# Patient Record
Sex: MALE | Race: WHITE | NOT HISPANIC OR LATINO | Employment: OTHER
[De-identification: names, ages, dates, MRNs, and addresses within clinical notes are randomized per-mention and may not be internally consistent; named-entity substitution may affect disease eponyms.]

---

## 2018-04-01 ENCOUNTER — HEALTH MAINTENANCE LETTER (OUTPATIENT)
Age: 68
End: 2018-04-01

## 2023-07-20 ENCOUNTER — TRANSFERRED RECORDS (OUTPATIENT)
Dept: MULTI SPECIALTY CLINIC | Facility: CLINIC | Age: 73
End: 2023-07-20

## 2023-07-20 LAB — RETINOPATHY: NORMAL

## 2024-05-06 ENCOUNTER — OFFICE VISIT (OUTPATIENT)
Dept: FAMILY MEDICINE | Facility: CLINIC | Age: 74
End: 2024-05-06
Payer: COMMERCIAL

## 2024-05-06 VITALS
OXYGEN SATURATION: 98 % | DIASTOLIC BLOOD PRESSURE: 80 MMHG | WEIGHT: 203.4 LBS | HEIGHT: 67 IN | TEMPERATURE: 97.9 F | BODY MASS INDEX: 31.92 KG/M2 | RESPIRATION RATE: 16 BRPM | HEART RATE: 61 BPM | SYSTOLIC BLOOD PRESSURE: 131 MMHG

## 2024-05-06 DIAGNOSIS — I10 HYPERTENSION GOAL BP (BLOOD PRESSURE) < 140/90: Primary | ICD-10-CM

## 2024-05-06 DIAGNOSIS — N18.31 TYPE 2 DIABETES MELLITUS WITH STAGE 3A CHRONIC KIDNEY DISEASE, WITHOUT LONG-TERM CURRENT USE OF INSULIN (H): ICD-10-CM

## 2024-05-06 DIAGNOSIS — R97.20 ELEVATED PROSTATE SPECIFIC ANTIGEN (PSA): ICD-10-CM

## 2024-05-06 DIAGNOSIS — Z12.5 SCREENING FOR PROSTATE CANCER: ICD-10-CM

## 2024-05-06 DIAGNOSIS — E11.22 TYPE 2 DIABETES MELLITUS WITH STAGE 3A CHRONIC KIDNEY DISEASE, WITHOUT LONG-TERM CURRENT USE OF INSULIN (H): ICD-10-CM

## 2024-05-06 DIAGNOSIS — E11.8 TYPE II DIABETES MELLITUS WITH COMPLICATION (H): ICD-10-CM

## 2024-05-06 LAB
ALBUMIN SERPL BCG-MCNC: 4.4 G/DL (ref 3.5–5.2)
ANION GAP SERPL CALCULATED.3IONS-SCNC: 11 MMOL/L (ref 7–15)
BUN SERPL-MCNC: 30.9 MG/DL (ref 8–23)
CALCIUM SERPL-MCNC: 9.7 MG/DL (ref 8.8–10.2)
CHLORIDE SERPL-SCNC: 104 MMOL/L (ref 98–107)
CREAT SERPL-MCNC: 1.33 MG/DL (ref 0.67–1.17)
DEPRECATED HCO3 PLAS-SCNC: 24 MMOL/L (ref 22–29)
EGFRCR SERPLBLD CKD-EPI 2021: 55 ML/MIN/1.73M2
GLUCOSE SERPL-MCNC: 131 MG/DL (ref 70–99)
HBA1C MFR BLD: 6.7 % (ref 0–5.6)
PHOSPHATE SERPL-MCNC: 3.4 MG/DL (ref 2.5–4.5)
POTASSIUM SERPL-SCNC: 5.3 MMOL/L (ref 3.4–5.3)
PSA SERPL DL<=0.01 NG/ML-MCNC: 4 NG/ML (ref 0–6.5)
SODIUM SERPL-SCNC: 139 MMOL/L (ref 135–145)

## 2024-05-06 PROCEDURE — G0103 PSA SCREENING: HCPCS | Performed by: FAMILY MEDICINE

## 2024-05-06 PROCEDURE — 83036 HEMOGLOBIN GLYCOSYLATED A1C: CPT | Performed by: FAMILY MEDICINE

## 2024-05-06 PROCEDURE — 99204 OFFICE O/P NEW MOD 45 MIN: CPT | Performed by: FAMILY MEDICINE

## 2024-05-06 PROCEDURE — 80069 RENAL FUNCTION PANEL: CPT | Performed by: FAMILY MEDICINE

## 2024-05-06 PROCEDURE — 36415 COLL VENOUS BLD VENIPUNCTURE: CPT | Performed by: FAMILY MEDICINE

## 2024-05-06 PROCEDURE — G2211 COMPLEX E/M VISIT ADD ON: HCPCS | Performed by: FAMILY MEDICINE

## 2024-05-06 RX ORDER — LISINOPRIL 10 MG/1
1 TABLET ORAL DAILY
COMMUNITY
Start: 2024-03-16 | End: 2024-06-18

## 2024-05-06 RX ORDER — METFORMIN HYDROCHLORIDE 750 MG/1
TABLET, EXTENDED RELEASE ORAL
COMMUNITY
Start: 2023-11-22 | End: 2024-06-18

## 2024-05-06 SDOH — HEALTH STABILITY: PHYSICAL HEALTH: ON AVERAGE, HOW MANY DAYS PER WEEK DO YOU ENGAGE IN MODERATE TO STRENUOUS EXERCISE (LIKE A BRISK WALK)?: 4 DAYS

## 2024-05-06 SDOH — HEALTH STABILITY: PHYSICAL HEALTH: ON AVERAGE, HOW MANY MINUTES DO YOU ENGAGE IN EXERCISE AT THIS LEVEL?: 40 MIN

## 2024-05-06 ASSESSMENT — SOCIAL DETERMINANTS OF HEALTH (SDOH): HOW OFTEN DO YOU GET TOGETHER WITH FRIENDS OR RELATIVES?: THREE TIMES A WEEK

## 2024-05-06 NOTE — PROGRESS NOTES
"  Assessment & Plan       ICD-10-CM    1. Hypertension goal BP (blood pressure) < 140/90  I10 Hemoglobin A1c     Renal panel (Alb, BUN, Ca, Cl, CO2, Creat, Gluc, Phos, K, Na)     Hemoglobin A1c     Renal panel (Alb, BUN, Ca, Cl, CO2, Creat, Gluc, Phos, K, Na)      2. Elevated prostate specific antigen (PSA)  R97.20 Prostate Specific Antigen Screen     Prostate Specific Antigen Screen      3. Screening for prostate cancer  Z12.5 Prostate Specific Antigen Screen     Prostate Specific Antigen Screen      4. Type 2 diabetes mellitus with stage 3a chronic kidney disease, without long-term current use of insulin (H)  E11.22 Hemoglobin A1c    N18.31 Renal panel (Alb, BUN, Ca, Cl, CO2, Creat, Gluc, Phos, K, Na)     Hemoglobin A1c     Renal panel (Alb, BUN, Ca, Cl, CO2, Creat, Gluc, Phos, K, Na)      5. Type II diabetes mellitus with complication (H)  E11.8         The longitudinal plan of care for the diagnosis(es)/condition(s) as documented were addressed during this visit. Due to the added complexity in care, I will continue to support Timothy in the subsequent management and with ongoing continuity of care.     Patient has been advised of split billing requirements and indicates understanding: Yes  Review of external notes as documented elsewhere in note        BMI  Estimated body mass index is 31.85 kg/m  as calculated from the following:    Height as of this encounter: 1.702 m (5' 7.01\").    Weight as of this encounter: 92.3 kg (203 lb 6.4 oz).   Weight management plan: Discussed healthy diet and exercise guidelines    Counseling  Appropriate preventive services were discussed with this patient, including applicable screening as appropriate for fall prevention, nutrition, physical activity, Tobacco-use cessation, weight loss and cognition.  Checklist reviewing preventive services available has been given to the patient.  Reviewed patient's diet, addressing concerns and/or questions.   He is at risk for psychosocial " distress and has been provided with information to reduce risk.   The patient was provided with written information regarding signs of hearing loss.       There are no Patient Instructions on file for this visit.    Brynn Aguirre is a 74 year old, presenting for the following health issues:  Diabetes, Blood Draw (PSA and kidney), and Establish Care      5/6/2024     8:34 AM   Additional Questions   Roomed by Ashley   Accompanied by lawanda         5/6/2024     8:34 AM   Patient Reported Additional Medications   Patient reports taking the following new medications metformin  mg tablet,extended release 24 hr, lisinopril 10 mg tablet     History of Present Illness       Reason for visit:  Looking for new health care provider and clinicHe exercises with enough effort to increase his heart rate 30 to 60 minutes per day.  He exercises with enough effort to increase his heart rate 4 days per week.   He is taking medications regularly.         Diabetes Follow-up    How often are you checking your blood sugar? A few times a month  What time of day are you checking your blood sugars (select all that apply)?  Before meals  Have you had any blood sugars above 200?  No  Have you had any blood sugars below 70?  No  What symptoms do you notice when your blood sugar is low?  None  What concerns do you have today about your diabetes? None   Do you have any of these symptoms? (Select all that apply)  No numbness or tingling in feet.  No redness, sores or blisters on feet.  No complaints of excessive thirst.  No reports of blurry vision.  No significant changes to weight.      Wt Readings from Last 4 Encounters:   05/06/24 92.3 kg (203 lb 6.4 oz)   03/30/13 102.9 kg (226 lb 12.8 oz)   10/12/12 99.3 kg (219 lb)   10/18/11 103.2 kg (227 lb 9.6 oz)       BP Readings from Last 2 Encounters:   05/06/24 131/80   03/30/13 149/87     LDL Cholesterol Calculated (mg/dL)   Date Value   10/05/2012 135 (H)   06/13/2011 139 (H)  "              Review of Systems  Constitutional, HEENT, cardiovascular, pulmonary, gi and gu systems are negative, except as otherwise noted.      Objective    /80   Pulse 61   Temp 97.9  F (36.6  C) (Temporal)   Resp 16   Ht 1.702 m (5' 7.01\")   Wt 92.3 kg (203 lb 6.4 oz)   SpO2 98%   BMI 31.85 kg/m    Body mass index is 31.85 kg/m .  Physical Exam  Constitutional:       General: He is not in acute distress.     Appearance: Normal appearance. He is well-developed. He is not ill-appearing.   HENT:      Head: Normocephalic and atraumatic.      Right Ear: External ear normal.      Left Ear: External ear normal.      Nose: Nose normal.   Eyes:      General: No scleral icterus.     Extraocular Movements: Extraocular movements intact.      Conjunctiva/sclera: Conjunctivae normal.   Cardiovascular:      Rate and Rhythm: Normal rate.   Pulmonary:      Effort: Pulmonary effort is normal.   Musculoskeletal:      Cervical back: Normal range of motion and neck supple.   Skin:     General: Skin is warm and dry.   Neurological:      Mental Status: He is alert and oriented to person, place, and time.   Psychiatric:         Behavior: Behavior normal.         Thought Content: Thought content normal.         Judgment: Judgment normal.                    Signed Electronically by: Royce Nicole MD    "

## 2024-05-07 ENCOUNTER — TRANSFERRED RECORDS (OUTPATIENT)
Dept: HEALTH INFORMATION MANAGEMENT | Facility: CLINIC | Age: 74
End: 2024-05-07
Payer: COMMERCIAL

## 2024-05-08 ENCOUNTER — TRANSFERRED RECORDS (OUTPATIENT)
Dept: HEALTH INFORMATION MANAGEMENT | Facility: CLINIC | Age: 74
End: 2024-05-08

## 2024-05-13 DIAGNOSIS — R79.89 ELEVATED SERUM CREATININE: Primary | ICD-10-CM

## 2024-05-21 ENCOUNTER — TRANSFERRED RECORDS (OUTPATIENT)
Dept: HEALTH INFORMATION MANAGEMENT | Facility: CLINIC | Age: 74
End: 2024-05-21
Payer: COMMERCIAL

## 2024-05-30 ENCOUNTER — TRANSFERRED RECORDS (OUTPATIENT)
Dept: HEALTH INFORMATION MANAGEMENT | Facility: CLINIC | Age: 74
End: 2024-05-30

## 2024-06-05 ENCOUNTER — TRANSFERRED RECORDS (OUTPATIENT)
Dept: HEALTH INFORMATION MANAGEMENT | Facility: CLINIC | Age: 74
End: 2024-06-05

## 2024-06-12 ENCOUNTER — TRANSFERRED RECORDS (OUTPATIENT)
Dept: HEALTH INFORMATION MANAGEMENT | Facility: CLINIC | Age: 74
End: 2024-06-12

## 2024-06-18 DIAGNOSIS — E11.22 TYPE 2 DIABETES MELLITUS WITH STAGE 3A CHRONIC KIDNEY DISEASE, WITHOUT LONG-TERM CURRENT USE OF INSULIN (H): ICD-10-CM

## 2024-06-18 DIAGNOSIS — N18.31 TYPE 2 DIABETES MELLITUS WITH STAGE 3A CHRONIC KIDNEY DISEASE, WITHOUT LONG-TERM CURRENT USE OF INSULIN (H): ICD-10-CM

## 2024-06-18 DIAGNOSIS — I10 HYPERTENSION GOAL BP (BLOOD PRESSURE) < 140/90: Primary | ICD-10-CM

## 2024-06-18 RX ORDER — METFORMIN HYDROCHLORIDE 750 MG/1
750 TABLET, EXTENDED RELEASE ORAL 2 TIMES DAILY WITH MEALS
Qty: 180 TABLET | Refills: 1 | Status: SHIPPED | OUTPATIENT
Start: 2024-06-18 | End: 2024-09-16

## 2024-06-18 RX ORDER — LISINOPRIL 10 MG/1
10 TABLET ORAL DAILY
Qty: 90 TABLET | Refills: 1 | Status: SHIPPED | OUTPATIENT
Start: 2024-06-18

## 2024-06-20 ENCOUNTER — TRANSFERRED RECORDS (OUTPATIENT)
Dept: HEALTH INFORMATION MANAGEMENT | Facility: CLINIC | Age: 74
End: 2024-06-20
Payer: COMMERCIAL

## 2024-07-15 ENCOUNTER — OFFICE VISIT (OUTPATIENT)
Dept: FAMILY MEDICINE | Facility: CLINIC | Age: 74
End: 2024-07-15
Payer: COMMERCIAL

## 2024-07-15 VITALS
SYSTOLIC BLOOD PRESSURE: 133 MMHG | HEART RATE: 68 BPM | WEIGHT: 201.4 LBS | RESPIRATION RATE: 16 BRPM | BODY MASS INDEX: 31.61 KG/M2 | OXYGEN SATURATION: 98 % | DIASTOLIC BLOOD PRESSURE: 82 MMHG | TEMPERATURE: 97.8 F | HEIGHT: 67 IN

## 2024-07-15 DIAGNOSIS — C61 PRIMARY MALIGNANT NEOPLASM OF PROSTATE (H): ICD-10-CM

## 2024-07-15 DIAGNOSIS — R97.20 ELEVATED PROSTATE SPECIFIC ANTIGEN (PSA): ICD-10-CM

## 2024-07-15 DIAGNOSIS — Z12.11 SCREEN FOR COLON CANCER: ICD-10-CM

## 2024-07-15 DIAGNOSIS — E11.8 TYPE II DIABETES MELLITUS WITH COMPLICATION (H): Primary | ICD-10-CM

## 2024-07-15 DIAGNOSIS — R79.89 ELEVATED SERUM CREATININE: ICD-10-CM

## 2024-07-15 DIAGNOSIS — Z12.5 SCREENING FOR PROSTATE CANCER: ICD-10-CM

## 2024-07-15 PROBLEM — N40.1 LOWER URINARY TRACT SYMPTOMS DUE TO BENIGN PROSTATIC HYPERPLASIA: Status: ACTIVE | Noted: 2023-11-29

## 2024-07-15 PROBLEM — K76.0 FATTY LIVER: Status: ACTIVE | Noted: 2022-07-30

## 2024-07-15 PROBLEM — R94.120 ABNORMAL AUDITORY FUNCTION STUDY: Status: ACTIVE | Noted: 2018-01-03

## 2024-07-15 PROBLEM — N18.30 CKD (CHRONIC KIDNEY DISEASE) STAGE 3, GFR 30-59 ML/MIN (H): Status: ACTIVE | Noted: 2019-10-10

## 2024-07-15 PROBLEM — R06.83 SNORING: Status: ACTIVE | Noted: 2018-12-17

## 2024-07-15 PROBLEM — E55.9 VITAMIN D DEFICIENCY: Status: ACTIVE | Noted: 2018-12-17

## 2024-07-15 PROBLEM — M16.12 PRIMARY OSTEOARTHRITIS OF LEFT HIP: Status: ACTIVE | Noted: 2019-10-31

## 2024-07-15 LAB
ALBUMIN SERPL BCG-MCNC: 4.4 G/DL (ref 3.5–5.2)
ANION GAP SERPL CALCULATED.3IONS-SCNC: 12 MMOL/L (ref 7–15)
BUN SERPL-MCNC: 25.5 MG/DL (ref 8–23)
CALCIUM SERPL-MCNC: 9.8 MG/DL (ref 8.8–10.2)
CHLORIDE SERPL-SCNC: 102 MMOL/L (ref 98–107)
CHOLEST SERPL-MCNC: 185 MG/DL
CREAT SERPL-MCNC: 1.32 MG/DL (ref 0.67–1.17)
CREAT UR-MCNC: 167 MG/DL
EGFRCR SERPLBLD CKD-EPI 2021: 56 ML/MIN/1.73M2
FASTING STATUS PATIENT QL REPORTED: YES
GLUCOSE SERPL-MCNC: 145 MG/DL (ref 70–99)
HBA1C MFR BLD: 6.8 % (ref 0–5.6)
HCO3 SERPL-SCNC: 25 MMOL/L (ref 22–29)
HDLC SERPL-MCNC: 35 MG/DL
LDLC SERPL CALC-MCNC: 119 MG/DL
MICROALBUMIN UR-MCNC: 27.6 MG/L
MICROALBUMIN/CREAT UR: 16.53 MG/G CR (ref 0–17)
NONHDLC SERPL-MCNC: 150 MG/DL
PHOSPHATE SERPL-MCNC: 3.8 MG/DL (ref 2.5–4.5)
POTASSIUM SERPL-SCNC: 5.2 MMOL/L (ref 3.4–5.3)
PSA SERPL DL<=0.01 NG/ML-MCNC: 4.75 NG/ML (ref 0–6.5)
SODIUM SERPL-SCNC: 139 MMOL/L (ref 135–145)
TRIGL SERPL-MCNC: 153 MG/DL

## 2024-07-15 PROCEDURE — 82043 UR ALBUMIN QUANTITATIVE: CPT | Performed by: FAMILY MEDICINE

## 2024-07-15 PROCEDURE — 80061 LIPID PANEL: CPT | Performed by: FAMILY MEDICINE

## 2024-07-15 PROCEDURE — 83036 HEMOGLOBIN GLYCOSYLATED A1C: CPT | Performed by: FAMILY MEDICINE

## 2024-07-15 PROCEDURE — 82570 ASSAY OF URINE CREATININE: CPT | Performed by: FAMILY MEDICINE

## 2024-07-15 PROCEDURE — G2211 COMPLEX E/M VISIT ADD ON: HCPCS | Performed by: FAMILY MEDICINE

## 2024-07-15 PROCEDURE — 80069 RENAL FUNCTION PANEL: CPT | Performed by: FAMILY MEDICINE

## 2024-07-15 PROCEDURE — 36415 COLL VENOUS BLD VENIPUNCTURE: CPT | Performed by: FAMILY MEDICINE

## 2024-07-15 PROCEDURE — 84153 ASSAY OF PSA TOTAL: CPT | Performed by: FAMILY MEDICINE

## 2024-07-15 PROCEDURE — 99214 OFFICE O/P EST MOD 30 MIN: CPT | Performed by: FAMILY MEDICINE

## 2024-07-15 NOTE — PROGRESS NOTES
Assessment & Plan       ICD-10-CM    1. Type II diabetes mellitus with complication (H)  E11.8 Albumin Random Urine Quantitative with Creat Ratio     Lipid panel reflex to direct LDL Fasting     Hemoglobin A1c     Albumin Random Urine Quantitative with Creat Ratio     Lipid panel reflex to direct LDL Fasting     Hemoglobin A1c      2. Screen for colon cancer  Z12.11       3. Elevated prostate specific antigen (PSA)  R97.20 Prostate Specific Antigen Screen     Prostate Specific Antigen Screen      4. Screening for prostate cancer  Z12.5 Prostate Specific Antigen Screen     Prostate Specific Antigen Screen      5. Elevated serum creatinine  R79.89 Renal panel (Alb, BUN, Ca, Cl, CO2, Creat, Gluc, Phos, K, Na)      6. Primary malignant neoplasm of prostate (H)  C61                   The longitudinal plan of care for the diagnosis(es)/condition(s) as documented were addressed during this visit. Due to the added complexity in care, I will continue to support Timothy in the subsequent management and with ongoing continuity of care.   There are no Patient Instructions on file for this visit.    Brynn Aguirre is a 74 year old, presenting for the following health issues:  Patient Request (Prostate dianosis) and Diabetes        7/15/2024     7:04 AM   Additional Questions   Roomed by Ashley   Accompanied by none         7/15/2024     7:04 AM   Patient Reported Additional Medications   Patient reports taking the following new medications none     Via the Health Maintenance questionnaire, the patient has reported the following services have been completed -Eye Exam: [facility] 2023-07-20, this information has been sent to the abstraction team.  History of Present Illness       CKD: He uses over the counter pain medication, including Tylenol, a few times a week.    Diabetes:   He presents for follow up of diabetes.  He is checking home blood glucose a few times a month.   He checks blood glucose before meals.   "Blood glucose is never over 200 and never under 70.  When his blood glucose is low, the patient is asymptomatic for confusion, blurred vision, lethargy and reports not feeling dizzy, shaky, or weak.   He has no concerns regarding his diabetes at this time.   He is not experiencing numbness or burning in feet, excessive thirst, blurry vision, weight changes or redness, sores or blisters on feet. The patient has had a diabetic eye exam in the last 12 months. Eye exam performed on 9/2023. Location of last eye exam [facility].        Vascular Disease:  He presents for follow up of vascular disease.     He never takes nitroglycerin. He takes daily aspirin.    Reason for visit:  A1C, PSA, Refer cardiology, inform about prostate cancer    He eats 0-1 servings of fruits and vegetables daily.He consumes 0 sweetened beverage(s) daily.He exercises with enough effort to increase his heart rate 30 to 60 minutes per day.  He exercises with enough effort to increase his heart rate 4 days per week.   He is taking medications regularly.     BP Readings from Last 6 Encounters:   07/15/24 133/82   05/06/24 131/80   03/30/13 149/87   10/12/12 124/68   10/18/11 134/78   06/13/11 136/82                 Review of Systems  Constitutional, HEENT, cardiovascular, pulmonary, gi and gu systems are negative, except as otherwise noted.      Objective    /82   Pulse 68   Temp 97.8  F (36.6  C) (Temporal)   Resp 16   Ht 1.702 m (5' 7.01\")   Wt 91.4 kg (201 lb 6.4 oz)   SpO2 98%   BMI 31.53 kg/m    Body mass index is 31.53 kg/m .  Physical Exam  Constitutional:       General: He is not in acute distress.     Appearance: Normal appearance. He is well-developed. He is not ill-appearing.   HENT:      Head: Normocephalic and atraumatic.      Right Ear: External ear normal.      Left Ear: External ear normal.      Nose: Nose normal.   Eyes:      General: No scleral icterus.     Extraocular Movements: Extraocular movements " intact.      Conjunctiva/sclera: Conjunctivae normal.   Cardiovascular:      Rate and Rhythm: Normal rate.   Pulmonary:      Effort: Pulmonary effort is normal.   Musculoskeletal:      Cervical back: Normal range of motion and neck supple.   Skin:     General: Skin is warm and dry.   Neurological:      Mental Status: He is alert and oriented to person, place, and time.   Psychiatric:         Behavior: Behavior normal.         Thought Content: Thought content normal.         Judgment: Judgment normal.                    Signed Electronically by: Royce Nicole MD

## 2024-08-28 ENCOUNTER — PATIENT OUTREACH (OUTPATIENT)
Dept: CARE COORDINATION | Facility: CLINIC | Age: 74
End: 2024-08-28
Payer: COMMERCIAL

## 2024-09-04 ENCOUNTER — TRANSFERRED RECORDS (OUTPATIENT)
Dept: HEALTH INFORMATION MANAGEMENT | Facility: CLINIC | Age: 74
End: 2024-09-04
Payer: COMMERCIAL

## 2024-09-11 ENCOUNTER — PATIENT OUTREACH (OUTPATIENT)
Dept: CARE COORDINATION | Facility: CLINIC | Age: 74
End: 2024-09-11
Payer: COMMERCIAL

## 2024-10-14 ENCOUNTER — TRANSFERRED RECORDS (OUTPATIENT)
Dept: HEALTH INFORMATION MANAGEMENT | Facility: CLINIC | Age: 74
End: 2024-10-14
Payer: COMMERCIAL

## 2024-11-17 ENCOUNTER — HEALTH MAINTENANCE LETTER (OUTPATIENT)
Age: 74
End: 2024-11-17

## 2024-12-04 DIAGNOSIS — I10 HYPERTENSION GOAL BP (BLOOD PRESSURE) < 140/90: ICD-10-CM

## 2024-12-04 RX ORDER — LISINOPRIL 10 MG/1
10 TABLET ORAL DAILY
Qty: 90 TABLET | Refills: 1 | Status: SHIPPED | OUTPATIENT
Start: 2024-12-04

## 2024-12-10 ENCOUNTER — TRANSFERRED RECORDS (OUTPATIENT)
Dept: HEALTH INFORMATION MANAGEMENT | Facility: CLINIC | Age: 74
End: 2024-12-10
Payer: COMMERCIAL

## 2024-12-13 SDOH — HEALTH STABILITY: PHYSICAL HEALTH: ON AVERAGE, HOW MANY MINUTES DO YOU ENGAGE IN EXERCISE AT THIS LEVEL?: 30 MIN

## 2024-12-13 SDOH — HEALTH STABILITY: PHYSICAL HEALTH: ON AVERAGE, HOW MANY DAYS PER WEEK DO YOU ENGAGE IN MODERATE TO STRENUOUS EXERCISE (LIKE A BRISK WALK)?: 3 DAYS

## 2024-12-13 ASSESSMENT — SOCIAL DETERMINANTS OF HEALTH (SDOH): HOW OFTEN DO YOU GET TOGETHER WITH FRIENDS OR RELATIVES?: TWICE A WEEK

## 2024-12-17 ENCOUNTER — OFFICE VISIT (OUTPATIENT)
Dept: FAMILY MEDICINE | Facility: CLINIC | Age: 74
End: 2024-12-17
Payer: COMMERCIAL

## 2024-12-17 VITALS
HEIGHT: 67 IN | RESPIRATION RATE: 20 BRPM | OXYGEN SATURATION: 97 % | SYSTOLIC BLOOD PRESSURE: 126 MMHG | DIASTOLIC BLOOD PRESSURE: 78 MMHG | BODY MASS INDEX: 32.87 KG/M2 | HEART RATE: 68 BPM | TEMPERATURE: 97.6 F | WEIGHT: 209.4 LBS

## 2024-12-17 DIAGNOSIS — Z00.00 WELL ADULT EXAM: Primary | ICD-10-CM

## 2024-12-17 DIAGNOSIS — N18.31 TYPE 2 DIABETES MELLITUS WITH STAGE 3A CHRONIC KIDNEY DISEASE, WITHOUT LONG-TERM CURRENT USE OF INSULIN (H): ICD-10-CM

## 2024-12-17 DIAGNOSIS — E11.22 TYPE 2 DIABETES MELLITUS WITH STAGE 3A CHRONIC KIDNEY DISEASE, WITHOUT LONG-TERM CURRENT USE OF INSULIN (H): ICD-10-CM

## 2024-12-17 DIAGNOSIS — N18.31 STAGE 3A CHRONIC KIDNEY DISEASE (H): ICD-10-CM

## 2024-12-17 DIAGNOSIS — E11.8 TYPE II DIABETES MELLITUS WITH COMPLICATION (H): ICD-10-CM

## 2024-12-17 LAB
ALBUMIN SERPL BCG-MCNC: 4.3 G/DL (ref 3.5–5.2)
ALP SERPL-CCNC: 78 U/L (ref 40–150)
ALT SERPL W P-5'-P-CCNC: 32 U/L (ref 0–70)
ANION GAP SERPL CALCULATED.3IONS-SCNC: 14 MMOL/L (ref 7–15)
AST SERPL W P-5'-P-CCNC: 24 U/L (ref 0–45)
BILIRUB SERPL-MCNC: 0.3 MG/DL
BUN SERPL-MCNC: 34.2 MG/DL (ref 8–23)
CALCIUM SERPL-MCNC: 10.2 MG/DL (ref 8.8–10.4)
CHLORIDE SERPL-SCNC: 101 MMOL/L (ref 98–107)
CREAT SERPL-MCNC: 1.24 MG/DL (ref 0.67–1.17)
EGFRCR SERPLBLD CKD-EPI 2021: 60 ML/MIN/1.73M2
ERYTHROCYTE [DISTWIDTH] IN BLOOD BY AUTOMATED COUNT: 13.1 % (ref 10–15)
EST. AVERAGE GLUCOSE BLD GHB EST-MCNC: 169 MG/DL
GLUCOSE SERPL-MCNC: 170 MG/DL (ref 70–99)
HBA1C MFR BLD: 7.5 % (ref 0–5.6)
HCO3 SERPL-SCNC: 23 MMOL/L (ref 22–29)
HCT VFR BLD AUTO: 37.9 % (ref 40–53)
HGB BLD-MCNC: 13 G/DL (ref 13.3–17.7)
HOLD SPECIMEN: NORMAL
MCH RBC QN AUTO: 31.9 PG (ref 26.5–33)
MCHC RBC AUTO-ENTMCNC: 34.3 G/DL (ref 31.5–36.5)
MCV RBC AUTO: 93 FL (ref 78–100)
PLATELET # BLD AUTO: 249 10E3/UL (ref 150–450)
POTASSIUM SERPL-SCNC: 5.5 MMOL/L (ref 3.4–5.3)
PROT SERPL-MCNC: 7.4 G/DL (ref 6.4–8.3)
RBC # BLD AUTO: 4.07 10E6/UL (ref 4.4–5.9)
SODIUM SERPL-SCNC: 138 MMOL/L (ref 135–145)
WBC # BLD AUTO: 5.9 10E3/UL (ref 4–11)

## 2024-12-17 PROCEDURE — G0402 INITIAL PREVENTIVE EXAM: HCPCS | Performed by: FAMILY MEDICINE

## 2024-12-17 PROCEDURE — 80053 COMPREHEN METABOLIC PANEL: CPT | Performed by: FAMILY MEDICINE

## 2024-12-17 PROCEDURE — 85027 COMPLETE CBC AUTOMATED: CPT | Performed by: FAMILY MEDICINE

## 2024-12-17 PROCEDURE — 83036 HEMOGLOBIN GLYCOSYLATED A1C: CPT | Performed by: FAMILY MEDICINE

## 2024-12-17 PROCEDURE — 99214 OFFICE O/P EST MOD 30 MIN: CPT | Mod: 25 | Performed by: FAMILY MEDICINE

## 2024-12-17 PROCEDURE — 36415 COLL VENOUS BLD VENIPUNCTURE: CPT | Performed by: FAMILY MEDICINE

## 2024-12-17 RX ORDER — METFORMIN HYDROCHLORIDE 750 MG/1
750 TABLET, EXTENDED RELEASE ORAL 2 TIMES DAILY WITH MEALS
Qty: 180 TABLET | Refills: 1 | Status: CANCELLED | OUTPATIENT
Start: 2024-12-17

## 2024-12-17 RX ORDER — ACYCLOVIR 400 MG/1
1 TABLET ORAL
Qty: 9 EACH | Refills: 5 | Status: SHIPPED | OUTPATIENT
Start: 2024-12-17

## 2024-12-17 RX ORDER — ACYCLOVIR 400 MG/1
1 TABLET ORAL ONCE
Qty: 1 EACH | Refills: 0 | Status: SHIPPED | OUTPATIENT
Start: 2024-12-17 | End: 2024-12-17

## 2024-12-17 NOTE — PROGRESS NOTES
"Preventive Care Visit  [facility] LENKA Nicole MD, Family Medicine  Dec 17, 2024      Assessment & Plan       ICD-10-CM    1. Well adult exam  Z00.00       2. Type 2 diabetes mellitus with stage 3a chronic kidney disease, without long-term current use of insulin (H)  E11.22 Continuous Glucose  (DEXCOM G7 ) JAILENE    N18.31 Continuous Glucose Sensor (DEXCOM G7 SENSOR) MISC      3. Stage 3a chronic kidney disease (H)  N18.31 Comprehensive metabolic panel     Comprehensive metabolic panel      4. Type II diabetes mellitus with complication (H)  E11.8 HEMOGLOBIN A1C     CBC with Platelets and Reflex to Iron Studies     Comprehensive metabolic panel     HEMOGLOBIN A1C     CBC with Platelets and Reflex to Iron Studies     Comprehensive metabolic panel            Patient has been advised of split billing requirements and indicates understanding: Yes        BMI  Estimated body mass index is 33.04 kg/m  as calculated from the following:    Height as of this encounter: 1.695 m (5' 6.75\").    Weight as of this encounter: 95 kg (209 lb 6.4 oz).   Weight management plan: Discussed healthy diet and exercise guidelines    Counseling  Appropriate preventive services were addressed with this patient via screening, questionnaire, or discussion as appropriate for fall prevention, nutrition, physical activity, Tobacco-use cessation, social engagement, weight loss and cognition.  Checklist reviewing preventive services available has been given to the patient.  Reviewed patient's diet, addressing concerns and/or questions.   He is at risk for lack of exercise and has been provided with information to increase physical activity for the benefit of his well-being.   Discussed possible causes of fatigue. The patient was provided with written information regarding signs of hearing loss.   Information on urinary incontinence and treatment options given to patient.       There are no Patient " Instructions on file for this visit.    Brynn Aguirre is a 74 year old, presenting for the following:  Wellness Visit (Medicare wellness)        12/17/2024     7:45 AM   Additional Questions   Roomed by mercy BLACK      Wt Readings from Last 4 Encounters:   12/17/24 95 kg (209 lb 6.4 oz)   07/15/24 91.4 kg (201 lb 6.4 oz)   05/06/24 92.3 kg (203 lb 6.4 oz)   03/30/13 102.9 kg (226 lb 12.8 oz)     Hemoglobin A1C   Date Value Ref Range Status   07/15/2024 6.8 (H) 0.0 - 5.6 % Final     Comment:     Normal <5.7%   Prediabetes 5.7-6.4%    Diabetes 6.5% or higher     Note: Adopted from ADA consensus guidelines.   05/06/2024 6.7 (H) 0.0 - 5.6 % Final     Comment:     Normal <5.7%   Prediabetes 5.7-6.4%    Diabetes 6.5% or higher     Note: Adopted from ADA consensus guidelines.                 Pt would like to discuss that he had a Leuprolide shot and his glucose numbers.  Pt would also like to discuss getting a dexcom sensor and would also like to discuss a possible aorta blockage which was noted on previous imaging.       Health Care Directive  Patient does not have a Health Care Directive: Patient states has Advance Directive and will bring in a copy to clinic.      12/13/2024   General Health   How would you rate your overall physical health? (!) FAIR   Feel stress (tense, anxious, or unable to sleep) Only a little      (!) STRESS CONCERN      12/13/2024   Nutrition   Diet: Low fat/cholesterol    Diabetic    Carbohydrate counting       Multiple values from one day are sorted in reverse-chronological order         12/13/2024   Exercise   Days per week of moderate/strenous exercise 3 days   Average minutes spent exercising at this level 30 min            12/13/2024   Social Factors   Frequency of gathering with friends or relatives Twice a week   Worry food won't last until get money to buy more No   Food not last or not have enough money for food? No   Do you have housing? (Housing is defined as stable  permanent housing and does not include staying ouside in a car, in a tent, in an abandoned building, in an overnight shelter, or couch-surfing.) Yes   Are you worried about losing your housing? No   Lack of transportation? No   Unable to get utilities (heat,electricity)? No            12/13/2024   Fall Risk   Fallen 2 or more times in the past year? No    Trouble with walking or balance? No        Patient-reported          12/13/2024   Activities of Daily Living- Home Safety   Needs help with the following daily activites None of the above   Safety concerns in the home None of the above            12/13/2024   Dental   Dentist two times every year? Yes            12/13/2024   Hearing Screening   Hearing concerns? (!) I FEEL THAT PEOPLE ARE MUMBLING OR NOT SPEAKING CLEARLY.    (!) I NEED TO ASK PEOPLE TO SPEAK UP OR REPEAT THEMSELVES.    (!) TROUBLE UNDERSTANDING SOFT OR WHISPERED SPEECH.       Multiple values from one day are sorted in reverse-chronological order         12/13/2024   Driving Risk Screening   Patient/family members have concerns about driving No            12/13/2024   General Alertness/Fatigue Screening   Have you been more tired than usual lately? (!) YES            12/13/2024   Urinary Incontinence Screening   Bothered by leaking urine in past 6 months Yes            5/6/2024   TB Screening   Were you born outside of the [place]? No            Today's PHQ-2 Score:       12/17/2024     7:30 AM   PHQ-2 ( 1999 Pf[employer])   Q1: Little interest or pleasure in doing things 0    Q2: Feeling down, depressed or hopeless 0    PHQ-2 Score 0    Q1: Little interest or pleasure in doing things Not at all   Q2: Feeling down, depressed or hopeless Not at all   PHQ-2 Score 0       Patient-reported           12/13/2024   Substance Use   Alcohol more than 3/day or more than 7/wk No   Do you have a current opioid prescription? No   How severe/bad is pain from 1 to 10? 2/10   Do you use any other substances recreationally? No         Social History     Tobacco Use    Smoking status: Former     Current packs/day: 0.00     Average packs/day: 1 pack/day for 7.0 years (7.0 ttl pk-yrs)     Types: Cigarettes     Start date: 10/7/1972     Quit date: 10/7/1979     Years since quittin.2    Smokeless tobacco: Never   Vaping Use    Vaping status: Never Used   Substance Use Topics    Alcohol use: Yes     Comment: 2 drinks per month    Drug use: No       ASCVD Risk   The 10-year ASCVD risk score (Magaly VOGEL, et al., 2019) is: 52.1%    Values used to calculate the score:      Age: 74 years      Sex: Male      Is Non- : No      Diabetic: Yes      Tobacco smoker: No      Systolic Blood Pressure: 126 mmHg      Is BP treated: Yes      HDL Cholesterol: 35 mg/dL      Total Cholesterol: 185 mg/dL            Reviewed and updated as needed this visit by Provider                    BP Readings from Last 3 Encounters:   24 126/78   07/15/24 133/82   24 131/80    Wt Readings from Last 3 Encounters:   24 95 kg (209 lb 6.4 oz)   07/15/24 91.4 kg (201 lb 6.4 oz)   24 92.3 kg (203 lb 6.4 oz)                  Current providers sharing in care for this patient include:  Patient Care Team:  Royce Rodríguez MD as PCP - General (Family Medicine)  Royce Dos Santos MD as MD (Urology)  Royce Rodríguez MD as Assigned PCP    The following health maintenance items are reviewed in Epic and correct as of today:  Health Maintenance   Topic Date Due    DIABETIC FOOT EXAM  Never done    URINALYSIS  Never done    HEPATITIS C SCREENING  Never done    HEMOGLOBIN  2012    ADVANCE CARE PLANNING  2016    COLORECTAL CANCER SCREENING  2023    MEDICARE ANNUAL WELLNESS VISIT  2024    A1C  10/15/2024    BMP  07/15/2025    LIPID  07/15/2025    MICROALBUMIN  07/15/2025    ANNUAL REVIEW OF HM ORDERS  07/15/2025    EYE EXAM  10/14/2025    FALL RISK ASSESSMENT  2025     "DTAP/TDAP/TD IMMUNIZATION (3 - Td or Tdap) 05/21/2034    PHQ-2 (once per calendar year)  Completed    INFLUENZA VACCINE  Completed    Pneumococcal Vaccine: 65+ Years  Completed    ZOSTER IMMUNIZATION  Completed    RSV VACCINE  Completed    COVID-19 Vaccine  Completed    HPV IMMUNIZATION  Aged Out    MENINGITIS IMMUNIZATION  Aged Out    RSV MONOCLONAL ANTIBODY  Aged Out         Review of Systems  Constitutional, HEENT, cardiovascular, pulmonary, gi and gu systems are negative, except as otherwise noted.     Objective    Exam  /78 (BP Location: Right arm, Patient Position: Sitting, Cuff Size: Adult Large)   Pulse 68   Temp 97.6  F (36.4  C) (Temporal)   Resp 20   Ht 1.695 m (5' 6.75\")   Wt 95 kg (209 lb 6.4 oz)   SpO2 97%   BMI 33.04 kg/m     Estimated body mass index is 33.04 kg/m  as calculated from the following:    Height as of this encounter: 1.695 m (5' 6.75\").    Weight as of this encounter: 95 kg (209 lb 6.4 oz).    Physical Exam  Vitals reviewed.   Constitutional:       Appearance: Normal appearance. He is not ill-appearing.   HENT:      Head: Normocephalic.      Right Ear: Tympanic membrane and ear canal normal.      Left Ear: Tympanic membrane and ear canal normal.      Nose: Nose normal.   Eyes:      Extraocular Movements: Extraocular movements intact.   Cardiovascular:      Rate and Rhythm: Normal rate and regular rhythm.      Heart sounds: Normal heart sounds. No murmur heard.  Pulmonary:      Effort: Pulmonary effort is normal. No respiratory distress.      Breath sounds: Normal breath sounds. No wheezing or rales.   Abdominal:      Palpations: Abdomen is soft.   Musculoskeletal:         General: Normal range of motion.      Cervical back: Normal range of motion and neck supple.   Skin:     General: Skin is warm and dry.      Findings: No lesion.   Neurological:      Mental Status: He is alert and oriented to person, place, and time.   Psychiatric:         Mood and Affect: Mood normal.    "      Behavior: Behavior normal.         Thought Content: Thought content normal.         Judgment: Judgment normal.               12/17/2024   Mini Cog   Clock Draw Score 2 Normal   3 Item Recall 3 objects recalled   Mini Cog Total Score 5                 Signed Electronically by: Royce Nicole MD

## 2024-12-18 ENCOUNTER — TELEPHONE (OUTPATIENT)
Dept: FAMILY MEDICINE | Facility: CLINIC | Age: 74
End: 2024-12-18

## 2024-12-18 NOTE — TELEPHONE ENCOUNTER
Prior Authorization Retail Medication Request    Medication/Dose: Dexcom G7  Device  Diagnosis and ICD code (if different than what is on RX):    New/renewal/insurance change PA/secondary ins. PA:  Previously Tried and Failed:    Rationale:  PA Required    Insurance   Primary: [facility]  Insurance ID:  [id number]    Secondary (if applicable):  Insurance ID:      Pharmacy Information (if different than what is on RX)  Name:  [facility]  Phone:  [phone]  Fax:[phone]    Clinic Information  Preferred routing pool for dept communication:  Primary Care Pool    KEY:[id number]  PATIENT LAST NAME:ADALI  :[id number]    Franca Chaudhari CMA  [facility]

## 2024-12-19 NOTE — TELEPHONE ENCOUNTER
PA Initiation    Medication:    Insurance Company: [employer] - Phone [phone] Fax [phone]  Pharmacy Filling the Rx: [facility] - [place], [place] - [address] [place] [address]  Filling Pharmacy Phone: [phone]  Filling Pharmacy Fax: [phone]  Start Date: 12/19/2024

## 2024-12-23 NOTE — TELEPHONE ENCOUNTER
PRIOR AUTHORIZATION DENIED    Medication:  Dexcom G7 reciever device  Insurance Company: [employer] - Phone [phone] Fax [phone]  Denial Date: 12/23/2024  Denial Reason(s): Criteria not met / must be treated with insulin  Appeal Information: see attached  Patient Notified: no

## 2024-12-26 NOTE — TELEPHONE ENCOUNTER
I tried calling the patient.LVM for patient to call the clinic back regarding a Prior Authorization.    Franca Chaudhari [facility]

## 2025-01-30 NOTE — TELEPHONE ENCOUNTER
I tired calling the patient. LVM letting him know that his Insurance has denied the PA for the Dexcom Sensor. I told him to call the clinic back if he has any questions or concerns.    Franca Chaudhari [facility]

## 2025-03-02 DIAGNOSIS — N18.31 TYPE 2 DIABETES MELLITUS WITH STAGE 3A CHRONIC KIDNEY DISEASE, WITHOUT LONG-TERM CURRENT USE OF INSULIN (H): ICD-10-CM

## 2025-03-02 DIAGNOSIS — E11.22 TYPE 2 DIABETES MELLITUS WITH STAGE 3A CHRONIC KIDNEY DISEASE, WITHOUT LONG-TERM CURRENT USE OF INSULIN (H): ICD-10-CM

## 2025-03-03 RX ORDER — METFORMIN HYDROCHLORIDE 750 MG/1
750 TABLET, EXTENDED RELEASE ORAL 2 TIMES DAILY WITH MEALS
Qty: 180 TABLET | Refills: 1 | Status: SHIPPED | OUTPATIENT
Start: 2025-03-03

## 2025-03-10 ENCOUNTER — OFFICE VISIT (OUTPATIENT)
Dept: FAMILY MEDICINE | Facility: CLINIC | Age: 75
End: 2025-03-10
Payer: COMMERCIAL

## 2025-03-10 VITALS
HEIGHT: 67 IN | BODY MASS INDEX: 33.59 KG/M2 | DIASTOLIC BLOOD PRESSURE: 78 MMHG | RESPIRATION RATE: 16 BRPM | WEIGHT: 214 LBS | TEMPERATURE: 97.3 F | HEART RATE: 78 BPM | SYSTOLIC BLOOD PRESSURE: 144 MMHG | OXYGEN SATURATION: 96 %

## 2025-03-10 DIAGNOSIS — E11.8 TYPE II DIABETES MELLITUS WITH COMPLICATION (H): Primary | ICD-10-CM

## 2025-03-10 DIAGNOSIS — E11.22 TYPE 2 DIABETES MELLITUS WITH STAGE 3A CHRONIC KIDNEY DISEASE, WITHOUT LONG-TERM CURRENT USE OF INSULIN (H): ICD-10-CM

## 2025-03-10 DIAGNOSIS — M54.50 CHRONIC LEFT-SIDED LOW BACK PAIN WITHOUT SCIATICA: ICD-10-CM

## 2025-03-10 DIAGNOSIS — D64.9 ANEMIA, UNSPECIFIED TYPE: ICD-10-CM

## 2025-03-10 DIAGNOSIS — G89.29 CHRONIC LEFT-SIDED LOW BACK PAIN WITHOUT SCIATICA: ICD-10-CM

## 2025-03-10 DIAGNOSIS — S39.012A STRAIN OF LUMBAR REGION, INITIAL ENCOUNTER: ICD-10-CM

## 2025-03-10 DIAGNOSIS — C61 PRIMARY MALIGNANT NEOPLASM OF PROSTATE (H): ICD-10-CM

## 2025-03-10 DIAGNOSIS — N18.31 TYPE 2 DIABETES MELLITUS WITH STAGE 3A CHRONIC KIDNEY DISEASE, WITHOUT LONG-TERM CURRENT USE OF INSULIN (H): ICD-10-CM

## 2025-03-10 LAB
ALBUMIN UR-MCNC: 30 MG/DL
APPEARANCE UR: CLEAR
BASOPHILS # BLD AUTO: 0 10E3/UL (ref 0–0.2)
BASOPHILS NFR BLD AUTO: 0 %
BILIRUB UR QL STRIP: NEGATIVE
COLOR UR AUTO: YELLOW
EOSINOPHIL # BLD AUTO: 0.1 10E3/UL (ref 0–0.7)
EOSINOPHIL NFR BLD AUTO: 2 %
ERYTHROCYTE [DISTWIDTH] IN BLOOD BY AUTOMATED COUNT: 12.9 % (ref 10–15)
EST. AVERAGE GLUCOSE BLD GHB EST-MCNC: 217 MG/DL
FERRITIN SERPL-MCNC: 196 NG/ML (ref 31–409)
FOLATE SERPL-MCNC: >40 NG/ML (ref 4.6–34.8)
GLUCOSE UR STRIP-MCNC: NEGATIVE MG/DL
HBA1C MFR BLD: 9.2 % (ref 0–5.6)
HCT VFR BLD AUTO: 38.5 % (ref 40–53)
HGB BLD-MCNC: 13 G/DL (ref 13.3–17.7)
HGB UR QL STRIP: NEGATIVE
IMM GRANULOCYTES # BLD: 0 10E3/UL
IMM GRANULOCYTES NFR BLD: 0 %
IRON BINDING CAPACITY (ROCHE): 300 UG/DL (ref 240–430)
IRON SATN MFR SERPL: 32 % (ref 15–46)
IRON SERPL-MCNC: 97 UG/DL (ref 61–157)
KETONES UR STRIP-MCNC: NEGATIVE MG/DL
LEUKOCYTE ESTERASE UR QL STRIP: NEGATIVE
LYMPHOCYTES # BLD AUTO: 1.7 10E3/UL (ref 0.8–5.3)
LYMPHOCYTES NFR BLD AUTO: 25 %
MCH RBC QN AUTO: 30.6 PG (ref 26.5–33)
MCHC RBC AUTO-ENTMCNC: 33.8 G/DL (ref 31.5–36.5)
MCV RBC AUTO: 91 FL (ref 78–100)
MONOCYTES # BLD AUTO: 0.6 10E3/UL (ref 0–1.3)
MONOCYTES NFR BLD AUTO: 8 %
MUCOUS THREADS #/AREA URNS LPF: PRESENT /LPF
NEUTROPHILS # BLD AUTO: 4.3 10E3/UL (ref 1.6–8.3)
NEUTROPHILS NFR BLD AUTO: 64 %
NITRATE UR QL: NEGATIVE
PH UR STRIP: 5 [PH] (ref 5–7)
PLATELET # BLD AUTO: 233 10E3/UL (ref 150–450)
RBC # BLD AUTO: 4.25 10E6/UL (ref 4.4–5.9)
RBC #/AREA URNS AUTO: ABNORMAL /HPF
RETICS # AUTO: 0.1 10E6/UL
RETICS/RBC NFR AUTO: 2.5 %
SP GR UR STRIP: >=1.03 (ref 1–1.03)
UROBILINOGEN UR STRIP-ACNC: 0.2 E.U./DL
VIT B12 SERPL-MCNC: 608 PG/ML (ref 232–1245)
WBC # BLD AUTO: 6.7 10E3/UL (ref 4–11)
WBC #/AREA URNS AUTO: ABNORMAL /HPF

## 2025-03-10 PROCEDURE — 82607 VITAMIN B-12: CPT | Performed by: FAMILY MEDICINE

## 2025-03-10 PROCEDURE — 81001 URINALYSIS AUTO W/SCOPE: CPT | Performed by: FAMILY MEDICINE

## 2025-03-10 PROCEDURE — 82728 ASSAY OF FERRITIN: CPT | Performed by: FAMILY MEDICINE

## 2025-03-10 PROCEDURE — G2211 COMPLEX E/M VISIT ADD ON: HCPCS | Performed by: FAMILY MEDICINE

## 2025-03-10 PROCEDURE — 83540 ASSAY OF IRON: CPT | Performed by: FAMILY MEDICINE

## 2025-03-10 PROCEDURE — 83036 HEMOGLOBIN GLYCOSYLATED A1C: CPT | Performed by: FAMILY MEDICINE

## 2025-03-10 PROCEDURE — 83550 IRON BINDING TEST: CPT | Performed by: FAMILY MEDICINE

## 2025-03-10 PROCEDURE — 99214 OFFICE O/P EST MOD 30 MIN: CPT | Performed by: FAMILY MEDICINE

## 2025-03-10 PROCEDURE — 3077F SYST BP >= 140 MM HG: CPT | Performed by: FAMILY MEDICINE

## 2025-03-10 PROCEDURE — 85045 AUTOMATED RETICULOCYTE COUNT: CPT | Performed by: FAMILY MEDICINE

## 2025-03-10 PROCEDURE — 85025 COMPLETE CBC W/AUTO DIFF WBC: CPT | Performed by: FAMILY MEDICINE

## 2025-03-10 PROCEDURE — 82746 ASSAY OF FOLIC ACID SERUM: CPT | Performed by: FAMILY MEDICINE

## 2025-03-10 PROCEDURE — 3078F DIAST BP <80 MM HG: CPT | Performed by: FAMILY MEDICINE

## 2025-03-10 PROCEDURE — 36415 COLL VENOUS BLD VENIPUNCTURE: CPT | Performed by: FAMILY MEDICINE

## 2025-03-10 ASSESSMENT — ENCOUNTER SYMPTOMS
FATIGUE: 1
BACK PAIN: 1

## 2025-03-10 NOTE — PROGRESS NOTES
Assessment & Plan       ICD-10-CM    1. Type II diabetes mellitus with complication (H)  E11.8 UA Macroscopic with reflex to Microscopic and Culture     HEMOGLOBIN A1C     semaglutide (OZEMPIC) 2 MG/3ML pen     UA Macroscopic with reflex to Microscopic and Culture     HEMOGLOBIN A1C      2. Strain of lumbar region, initial encounter  S39.012A Physical Therapy  Referral      3. Chronic left-sided low back pain without sciatica  M54.50 Physical Therapy  Referral    G89.29       4. Anemia, unspecified type  D64.9 CBC with Platelets & Differential     Iron & Iron Binding Capacity     Vitamin B12     Reticulocyte count     Lab Blood Morphology Pathologist Review     Folate     Ferritin     Iron & Iron Binding Capacity     Vitamin B12     Lab Blood Morphology Pathologist Review     Folate     Ferritin     CANCELED: CBC with Platelets & Differential     CANCELED: Reticulocyte count      5. Type 2 diabetes mellitus with stage 3a chronic kidney disease, without long-term current use of insulin (H)  E11.22     N18.31       6. Primary malignant neoplasm of prostate (H)  C61             The longitudinal plan of care for the diagnosis(es)/condition(s) as documented were addressed during this visit. Due to the added complexity in care, I will continue to support Timothy in the subsequent management and with ongoing continuity of care.         There are no Patient Instructions on file for this visit.    Brynn Aguirre is a 75 year old, presenting for the following health issues:  Back Pain and Fatigue      3/10/2025     7:55 AM   Additional Questions   Roomed by Ashley   Accompanied by none         3/10/2025     7:55 AM   Patient Reported Additional Medications   Patient reports taking the following new medications none     History of Present Illness       Back Pain:  He presents for follow up of back pain. Patient's back pain is a chronic problem.  Location of back pain:  Left lower back  Description of back  "pain: dull ache and sharp  Back pain spreads: nowhere    Since patient first noticed back pain, pain is: always present, but gets better and worse  Does back pain interfere with his job:  No       Reason for visit:  General fatique and back pain  Symptom onset:  More than a month  Symptoms include:  Fatique and back sorness  Symptom intensity:  Severe  Symptom progression:  Staying the same  Had these symptoms before:  Yes  Has tried/received treatment for these symptoms:  Yes  Previous treatment was successful:  Yes  Prior treatment description:  New hip and PT  What makes it worse:  Walking on concrete  What makes it better:  Sitting   He is taking medications regularly.        Chronic intermittent lower back pain  Sided, no pain radiation  Usually flares after walking for about 15 minutes, after sitting for about 30 minutes    Fatigue  Tired easily when active  History of mild anemia, started iron supplement  Seems to get good sleep, does snore    H/o left hip replacement      BP Readings from Last 6 Encounters:   03/10/25 (!) 144/78   12/17/24 126/78   07/15/24 133/82   05/06/24 131/80   03/30/13 149/87   10/12/12 124/68       Wt Readings from Last 4 Encounters:   03/10/25 97.1 kg (214 lb)   12/17/24 95 kg (209 lb 6.4 oz)   07/15/24 91.4 kg (201 lb 6.4 oz)   05/06/24 92.3 kg (203 lb 6.4 oz)                     Review of Systems  Constitutional, HEENT, cardiovascular, pulmonary, gi and gu systems are negative, except as otherwise noted.      Objective    BP (!) 144/78   Pulse 78   Temp 97.3  F (36.3  C) (Temporal)   Resp 16   Ht 1.695 m (5' 6.75\")   Wt 97.1 kg (214 lb)   SpO2 96%   BMI 33.77 kg/m    Body mass index is 33.77 kg/m .  Physical Exam  Constitutional:       General: He is not in acute distress.     Appearance: Normal appearance. He is well-developed. He is not ill-appearing.   HENT:      Head: Normocephalic and atraumatic.      Right Ear: External ear normal.      Left Ear: External ear normal. "      Nose: Nose normal.   Eyes:      General: No scleral icterus.     Extraocular Movements: Extraocular movements intact.      Conjunctiva/sclera: Conjunctivae normal.   Cardiovascular:      Rate and Rhythm: Normal rate.   Pulmonary:      Effort: Pulmonary effort is normal.   Musculoskeletal:      Cervical back: Normal range of motion and neck supple.   Skin:     General: Skin is warm and dry.   Neurological:      Mental Status: He is alert and oriented to person, place, and time.   Psychiatric:         Behavior: Behavior normal.         Thought Content: Thought content normal.         Judgment: Judgment normal.                    Signed Electronically by: Royce Nicole MD

## 2025-03-13 LAB
PATH REPORT.COMMENTS IMP SPEC: NORMAL
PATH REPORT.FINAL DX SPEC: NORMAL
PATH REPORT.MICROSCOPIC SPEC OTHER STN: NORMAL
PATH REPORT.MICROSCOPIC SPEC OTHER STN: NORMAL
PATH REPORT.RELEVANT HX SPEC: NORMAL

## 2025-03-24 NOTE — PROGRESS NOTES
PHYSICAL THERAPY EVALUATION  Type of Visit: Evaluation       Fall Risk Screen:  Fall screen completed by: PT  Have you fallen 2 or more times in the past year?: No  Have you fallen and had an injury in the past year?: No  Is patient a fall risk?: No    Subjective         Presenting condition or subjective complaint: lower back pain  Date of onset: 03/10/25 (Referral date)    Relevant medical history: Anemia; Arthritis; Cancer; Diabetes; High blood pressure; Kidney disease; Overweight; Radiation treatment   Dates & types of surgery: left hip replacement     ortho left knee    Prior diagnostic imaging/testing results: MRI; X-ray     Prior therapy history for the same diagnosis, illness or injury: Yes can not remember     6 or 7 years    Pt is a 75 year old male presenting with complaints of low back pain. Pt reports most of pain is in left lower back and is dull and sometimes sharp - does not travel. Pt reports pain is always present but can get worse/better. Pt reports that he is taking arthritis tylenol every morning - has been doing this for a couple of years, per referring provider. Then also started taking an Aleve in addition, which has seemed to improve condition.   Pt reports that he has had back pain since 30s/40s, then put on weight in his 50s, which increased pain. Pt replaced his L hip just before the pandemic, which decreased back pain. Pt had radiation last summer for prostate cancer - gained weight again and lost a lot muscle tone - low back pain started again.    Pt does belong to the [place].     Pt describes the pain as dull and intermittently sharp.     Aggravating Factors: walking > 10 minutes at a time/3-4 blocks, standing, intermittently STS, sitting up/standing up straight     Alleviating Factors: sitting, lying down    Prior treatments: [facility] PT prior to hip replacement, stretching (hooklying rotation, quadratus lumborum, cat/cows)    Sleep Quality: not affected    Red Flags: none     Pt goals:  increase walking distance (at least 45 minutes), get into workout routine     PMH: arthritis, cancer, chest pain, diabetes, hyperlipidemia, HTN, shingles     Living Environment  Social support: With a significant other or spouse   Type of home: House; Multi-level   Stairs to enter the home: Yes 2 Is there a railing: No     Ramp: No   Stairs inside the home: Yes 6 Is there a railing: Yes     Help at home: None  Equipment owned: Straight Cane; Walker with wheels     Employment:      Hobbies/Interests:      Patient goals for therapy: walk distances     Objective   LUMBAR:    Posture: min increased thoracic kyphosis    Gait: WFL    Functional Screen:   -Sit to stand: WFL    ROM (* Denotes Pain):  -Flexion: hands 3/4 way down shin  -Extension: mod-significant limitation  -L SB: min limitation  -R SB: min limitation   -B SG: mod-significant limitation     Repeated Motion Exam:   -Repeated flexion in supine: no change in pain  -Repeated flexion in sitting: abolishment of pain    Neuro Screen: NT    Special Tests:   L R   Slump - -   SLR - -   [place] Traction      FADIR     FABIR     Scour     Hamstring 90/90     Piriformis Test     Posterior Capsule Compression       LE Relevant Findings:   -Strength: hip flex: R: 5/5, R: 4/5   -ROM: significant limited hamstring ROM    Joint Mobility:  -PA spring: NT    Palpation: not significant TTP noted along low back     All weight progressions in therapy sessions are dictated by the patient tolerance and therapist discretion. Testing on MedX equipment is completed on 4-week intervals to allow for physiological change in muscle structure and neuromuscular re-education.    Lumbar MedX Initial testing    AROM (full=  0-72  lumbar) 0-51   Max Extension Torque  383   Flex: ext ratio (ideal 1.4:1) 4.04:1     Date 3/26   Lumbar Parameters    Top Dead Center (TDC) 21   Counterbalance (CB) 251   Seat Pad 0   Femur Restraint 5   Week/Visit    Enter Week/Visit # 1/1   Weight (lbs)    Reps  (#)    Time    ROM (degrees)    Pain    Therapist cues      Date 3/26   Exercise    Hamstring stretch 1x30 sec/side   Seated flexion 2x10                                             Modifications instructed by therapist: verbal and visual cues for proper set up, form, and stretch       Assessment & Plan   CLINICAL IMPRESSIONS  Medical Diagnosis: Strain of lumbar region, initial encounter; Chronic left-sided low back pain without sciatica    Treatment Diagnosis: Low back pain   Impression/Assessment: Patient is a 75 year old male with low back pain complaints.  The following significant findings have been identified: Pain, Decreased ROM/flexibility, Decreased joint mobility, Decreased strength, Impaired balance, Impaired gait, Impaired muscle performance, and Decreased activity tolerance. These impairments interfere with their ability to perform self care tasks, recreational activities, household chores, driving , household mobility, and community mobility as compared to previous level of function.     Clinical Decision Making (Complexity):  Clinical Presentation: Stable/Uncomplicated  Clinical Presentation Rationale: based on medical and personal factors listed in PT evaluation  Clinical Decision Making (Complexity): Low complexity    PLAN OF CARE  Treatment Interventions:  Modalities: Cryotherapy, Hot Pack  Interventions: Gait Training, Manual Therapy, Neuromuscular Re-education, Therapeutic Activity, Therapeutic Exercise, Self-Care/Home Management    Long Term Goals     PT Goal 1  Goal Identifier: HEP IND  Goal Description: Pt will demonstrate good form of and verbalize consistency with HEP in order to progress to IND management of symptoms  Rationale: to maximize safety and independence with performance of ADLs and functional tasks;to maximize safety and independence with self cares  Target Date: 05/21/25  PT Goal 2  Goal Identifier: Walking  Goal Description: Pt will be able to walk for at least 30 minutes  without being limited by pain in order to progress towards return to PLOF  Rationale: to maximize safety and independence with self cares;to maximize safety and independence within the community;to maximize safety and independence with performance of ADLs and functional tasks  Target Date: 06/18/25  PT Goal 3  Goal Identifier: MedX  Goal Description: Pt will improve flex:ext ratio to at least 3:1 in order to demonstrate improvement in extension strength and tolerance to extension positioning  Rationale: to maximize safety and independence with performance of ADLs and functional tasks;to maximize safety and independence with self cares;to maximize safety and independence within the home;to maximize safety and independence within the community  Target Date: 06/18/25      Frequency of Treatment: 2x/week for 4 weeks, decreasing to 1x/week for up to 8 weeks  Duration of Treatment: 12 weeks    Recommended Referrals to Other Professionals:  none  Education Assessment:   Learner/Method: Patient    Risks and benefits of evaluation/treatment have been explained.   Patient/Family/caregiver agrees with Plan of Care.     Evaluation Time:     PT Eval, Low Complexity Minutes (77261): 20     Signing Clinician: Diana Fuller PT        [facility]                                                                                   OUTPATIENT PHYSICAL THERAPY      PLAN OF TREATMENT FOR OUTPATIENT REHABILITATION   Patient's Last Name, First Name, Timothy Gallegos YOB: 1950   Provider's Name   [facility]   Medical Record No.  [id number]     Onset Date: 03/10/25 (Referral date)  Start of Care Date: 03/26/25     Medical Diagnosis:  Strain of lumbar region, initial encounter; Chronic left-sided low back pain without sciatica      PT Treatment Diagnosis:  Low back pain Plan of Treatment  Frequency/Duration: 2x/week for 4 weeks, decreasing to 1x/week for  up to 8 weeks/ 12 weeks    Certification date from 03/26/25 to 06/18/25         See note for plan of treatment details and functional goals     Diana Fuller, PT                         I CERTIFY THE NEED FOR THESE SERVICES FURNISHED UNDER        THIS PLAN OF TREATMENT AND WHILE UNDER MY CARE     (Physician attestation of this document indicates review and certification of the therapy plan).              Referring Provider:  Royce Rodríguez    Initial Assessment  See Epic Evaluation- Start of Care Date: 03/26/25

## 2025-03-26 ENCOUNTER — THERAPY VISIT (OUTPATIENT)
Dept: PHYSICAL THERAPY | Facility: CLINIC | Age: 75
End: 2025-03-26
Attending: FAMILY MEDICINE
Payer: COMMERCIAL

## 2025-03-26 DIAGNOSIS — M54.50 CHRONIC LEFT-SIDED LOW BACK PAIN WITHOUT SCIATICA: ICD-10-CM

## 2025-03-26 DIAGNOSIS — S39.012A STRAIN OF LUMBAR REGION, INITIAL ENCOUNTER: ICD-10-CM

## 2025-03-26 DIAGNOSIS — G89.29 CHRONIC LEFT-SIDED LOW BACK PAIN WITHOUT SCIATICA: ICD-10-CM

## 2025-03-26 PROCEDURE — 97110 THERAPEUTIC EXERCISES: CPT | Mod: GP

## 2025-03-26 PROCEDURE — 97161 PT EVAL LOW COMPLEX 20 MIN: CPT | Mod: GP

## 2025-04-07 ENCOUNTER — THERAPY VISIT (OUTPATIENT)
Dept: PHYSICAL THERAPY | Facility: CLINIC | Age: 75
End: 2025-04-07
Payer: COMMERCIAL

## 2025-04-07 DIAGNOSIS — S39.012A STRAIN OF LUMBAR REGION, INITIAL ENCOUNTER: Primary | ICD-10-CM

## 2025-04-07 DIAGNOSIS — M54.50 CHRONIC LEFT-SIDED LOW BACK PAIN WITHOUT SCIATICA: ICD-10-CM

## 2025-04-07 DIAGNOSIS — G89.29 CHRONIC LEFT-SIDED LOW BACK PAIN WITHOUT SCIATICA: ICD-10-CM

## 2025-04-07 PROCEDURE — 97110 THERAPEUTIC EXERCISES: CPT | Mod: GP | Performed by: PHYSICAL THERAPIST

## 2025-04-07 NOTE — PROGRESS NOTES
All weight progressions in therapy sessions are dictated by the patient tolerance and therapist discretion. Testing on MedX equipment is completed on 4-week intervals to allow for physiological change in muscle structure and neuromuscular re-education.    Lumbar MedX Initial testing    AROM (full=  0-72  lumbar) 0-51   Max Extension Torque  383   Flex: ext ratio (ideal 1.4:1) 4.04:1     Date 4/7/2025 3/26   Lumbar Parameters     Top Dead Center (TDC) 21 21   Counterbalance (CB) 251 251   Seat Pad 0 0   Femur Restraint 5 5   Week/Visit     Enter Week/Visit # 1/2 1/1   Weight (lbs) 130#    Reps (#) 17    Time 222    ROM (degrees) 0-51    Pain     Therapist cues       Date 4/7/2025 3/26   Exercise     Treadmill X 5 min     Rotary torso 90 sec X 30#'s started R    Hamstring stretch  1x30 sec/side   Seated flexion  2x10   Prone Extensions X 10     Bridge X 10                                               Modifications instructed by therapist: verbal and visual cues for proper set up, form, and stretch

## 2025-04-09 ENCOUNTER — THERAPY VISIT (OUTPATIENT)
Dept: PHYSICAL THERAPY | Facility: CLINIC | Age: 75
End: 2025-04-09
Payer: COMMERCIAL

## 2025-04-09 DIAGNOSIS — G89.29 CHRONIC LEFT-SIDED LOW BACK PAIN WITHOUT SCIATICA: ICD-10-CM

## 2025-04-09 DIAGNOSIS — M54.50 CHRONIC LEFT-SIDED LOW BACK PAIN WITHOUT SCIATICA: ICD-10-CM

## 2025-04-09 DIAGNOSIS — S39.012A STRAIN OF LUMBAR REGION, INITIAL ENCOUNTER: Primary | ICD-10-CM

## 2025-04-09 PROCEDURE — 97110 THERAPEUTIC EXERCISES: CPT | Mod: GP | Performed by: PHYSICAL THERAPIST

## 2025-04-09 NOTE — PROGRESS NOTES
All weight progressions in therapy sessions are dictated by the patient tolerance and therapist discretion. Testing on MedX equipment is completed on 4-week intervals to allow for physiological change in muscle structure and neuromuscular re-education.    Lumbar MedX Initial testing    AROM (full=  0-72  lumbar) 0-51   Max Extension Torque  383   Flex: ext ratio (ideal 1.4:1) 4.04:1     Date 4/9/2025 4/7/2025 3/26   Lumbar Parameters      Top Dead Center (TDC) 21 21 21   Counterbalance (CB) 251 251 251   Seat Pad 0 0 0   Femur Restraint 5 5 5   Week/Visit      Enter Week/Visit # 2/1 1/2 1/1   Weight (lbs) 135# 13#    Reps (#) 17 17    Time 157 222    ROM (degrees) 0-51 0-51    Pain      Therapist cues        Date 4/9/2025 4/7/2025 3/26   Exercise      Treadmill X 5 min  X 5 min     Rotary torso 90 sec X 40#'s started L X 30#'s started R    Hamstring stretch X 30 sec  1x30 sec/side   Seated flexion 2 X 10 sec  2x10   Prone Extensions Review X 10 improving motion X 10     Bridge  X 10     Kneeling to chair and supine hip flexor stretch X 30 sec                                                 Modifications instructed by therapist: verbal and visual cues for proper set up, form, and stretch

## 2025-04-14 ENCOUNTER — THERAPY VISIT (OUTPATIENT)
Dept: PHYSICAL THERAPY | Facility: CLINIC | Age: 75
End: 2025-04-14
Payer: COMMERCIAL

## 2025-04-14 DIAGNOSIS — S39.012A STRAIN OF LUMBAR REGION, INITIAL ENCOUNTER: Primary | ICD-10-CM

## 2025-04-14 DIAGNOSIS — M54.50 CHRONIC LEFT-SIDED LOW BACK PAIN WITHOUT SCIATICA: ICD-10-CM

## 2025-04-14 DIAGNOSIS — G89.29 CHRONIC LEFT-SIDED LOW BACK PAIN WITHOUT SCIATICA: ICD-10-CM

## 2025-04-14 PROCEDURE — 97110 THERAPEUTIC EXERCISES: CPT | Mod: GP | Performed by: PHYSICAL THERAPIST

## 2025-04-14 NOTE — PROGRESS NOTES
All weight progressions in therapy sessions are dictated by the patient tolerance and therapist discretion. Testing on MedX equipment is completed on 4-week intervals to allow for physiological change in muscle structure and neuromuscular re-education.    Lumbar MedX Initial testing    AROM (full=  0-72  lumbar) 0-51   Max Extension Torque  383   Flex: ext ratio (ideal 1.4:1) 4.04:1     Date 4/14/2025 4/9/2025 4/7/2025 3/26   Lumbar Parameters       Top Dead Center (TDC) 21 21 21 21   Counterbalance (CB) 251 251 251 251   Seat Pad 0 0 0 0   Femur Restraint 5 5 5 5   Week/Visit       Enter Week/Visit # 2/2 2/1 1/2 1/1   Weight (lbs) 138# 135# 13#    Reps (#) 20 17 17    Time 160 157 222    ROM (degrees) 0-51 0-51 0-51    Pain       Therapist cues         Date 4/14/2025 4/9/2025 4/7/2025 3/26   Exercise       Treadmill X 5 min  X 5 min  X 5 min     Rotary torso 90 sec X 44#'s started R X 40#'s started L X 30#'s started R    Hamstring stretch  X 30 sec  1x30 sec/side   Seated flexion  2 X 10 sec  2x10   Prone Extensions  Review X 10 improving motion X 10     Bridge   X 10     Kneeling to chair and supine hip flexor stretch X 30 sec supine X 30 sec     Reformer 90/90 ab set tap down to bar/reverse crunch  2nd to top notch X 10                                                   Modifications instructed by therapist: verbal and visual cues for proper set up, form, and stretch

## 2025-04-21 ENCOUNTER — THERAPY VISIT (OUTPATIENT)
Dept: PHYSICAL THERAPY | Facility: CLINIC | Age: 75
End: 2025-04-21
Payer: COMMERCIAL

## 2025-04-21 DIAGNOSIS — S39.012A STRAIN OF LUMBAR REGION, INITIAL ENCOUNTER: Primary | ICD-10-CM

## 2025-04-21 DIAGNOSIS — M54.50 CHRONIC LEFT-SIDED LOW BACK PAIN WITHOUT SCIATICA: ICD-10-CM

## 2025-04-21 DIAGNOSIS — G89.29 CHRONIC LEFT-SIDED LOW BACK PAIN WITHOUT SCIATICA: ICD-10-CM

## 2025-04-21 PROCEDURE — 97110 THERAPEUTIC EXERCISES: CPT | Mod: GP | Performed by: PHYSICAL THERAPIST

## 2025-04-21 NOTE — PROGRESS NOTES
All weight progressions in therapy sessions are dictated by the patient tolerance and therapist discretion. Testing on MedX equipment is completed on 4-week intervals to allow for physiological change in muscle structure and neuromuscular re-education.    Lumbar MedX Initial testing    AROM (full=  0-72  lumbar) 0-51   Max Extension Torque  383   Flex: ext ratio (ideal 1.4:1) 4.04:1     Date 4/21/2025 4/14/2025 4/9/2025 4/7/2025 3/26   Lumbar Parameters        Top Dead Center (TDC) 21 21 21 21 21   Counterbalance (CB) 251 251 251 251 251   Seat Pad 0 0 0 0 0   Femur Restraint 5 5 5 5 5   Week/Visit        Enter Week/Visit # 3/1 2/2 2/1 1/2 1/1   Weight (lbs) 141# 138# 135# 130#    Reps (#) 20 20 17 17    Time 111 160 157 222    ROM (degrees) 0-51 0-51 0-51 0-51    Pain        Therapist cues          Date 4/21/2025 4/14/2025 4/9/2025 4/7/2025 3/26   Exercise        Treadmill X 5 min  X 5 min  X 5 min  X 5 min     Rotary torso 90 sec X 44#'s started L X 44#'s started R X 40#'s started L X 30#'s started R    Hamstring stretch   X 30 sec  1x30 sec/side   Seated flexion   2 X 10 sec  2x10   Prone Extensions   Review X 10 improving motion X 10     Bridge    X 10     Kneeling to chair and supine hip flexor stretch  X 30 sec supine X 30 sec     Reformer 90/90 ab set tap down to bar/reverse crunch   2nd to top notch X 10       Marcelo Chair X 10        Reformer leg press X 20 all springs                                         Modifications instructed by therapist: verbal and visual cues for proper set up, form, and stretch

## 2025-04-22 ENCOUNTER — OFFICE VISIT (OUTPATIENT)
Dept: PHARMACY | Facility: CLINIC | Age: 75
End: 2025-04-22
Attending: FAMILY MEDICINE
Payer: COMMERCIAL

## 2025-04-22 ENCOUNTER — MYC MEDICAL ADVICE (OUTPATIENT)
Dept: FAMILY MEDICINE | Facility: CLINIC | Age: 75
End: 2025-04-22
Payer: COMMERCIAL

## 2025-04-22 VITALS — SYSTOLIC BLOOD PRESSURE: 122 MMHG | DIASTOLIC BLOOD PRESSURE: 76 MMHG

## 2025-04-22 DIAGNOSIS — K08.89 TOOTH PAIN: ICD-10-CM

## 2025-04-22 DIAGNOSIS — I10 ESSENTIAL HYPERTENSION: Primary | ICD-10-CM

## 2025-04-22 DIAGNOSIS — E11.8 TYPE II DIABETES MELLITUS WITH COMPLICATION (H): ICD-10-CM

## 2025-04-22 DIAGNOSIS — E78.5 DYSLIPIDEMIA: ICD-10-CM

## 2025-04-22 DIAGNOSIS — Z78.9 TAKES DIETARY SUPPLEMENTS: ICD-10-CM

## 2025-04-22 DIAGNOSIS — E11.9 TYPE 2 DIABETES MELLITUS WITHOUT COMPLICATION, WITHOUT LONG-TERM CURRENT USE OF INSULIN (H): ICD-10-CM

## 2025-04-22 PROCEDURE — 99607 MTMS BY PHARM ADDL 15 MIN: CPT

## 2025-04-22 PROCEDURE — 3078F DIAST BP <80 MM HG: CPT

## 2025-04-22 PROCEDURE — 3074F SYST BP LT 130 MM HG: CPT

## 2025-04-22 PROCEDURE — 99605 MTMS BY PHARM NP 15 MIN: CPT

## 2025-04-22 RX ORDER — OMEGA-3 FATTY ACIDS/FISH OIL 300-1000MG
200 CAPSULE ORAL
COMMUNITY

## 2025-04-22 NOTE — LETTER
"Recommended To-Do List      Prepared on: Apr 22, 2025       You can get the best results from your medications by completing the items on this \"To-Do List.\"      Bring your To-Do List when you go to your doctor. And, share it with your family or caregivers.    My To-Do List:  What we talked about: What I should do:   Needing additional monitoring    Start checking blood pressures once a day          What we talked about: What I should do:   Needing additional monitoring    Check your blood sugars once in the morning and 2 hours after largest meal. Fasting <130 mg/dL and after meal <180          What we talked about: What I should do:                     "

## 2025-04-22 NOTE — LETTER
_  Medication List        Prepared on: Apr 22, 2025     Bring your Medication List when you go to the doctor, hospital, or   emergency room. And, share it with your family or caregivers.     Note any changes to how you take your medications.  Cross out medications when you no longer use them.    Medication How I take it Why I use it Prescriber   ASPIRIN 81 MG OR TABS 1 TABLET DAILY  Type 2 diabetes mellitus with stage 3a chronic kidney disease, without long-term current use of insulin (H) Royce Rodríguez MD   ibuprofen (ADVIL/MOTRIN) 200 MG capsule Take 200 mg by mouth. 2 tablets 3 times a day  Tooth pain Patient Reported   lisinopril (ZESTRIL) 10 MG tablet TAKE ONE TABLET BY MOUTH EVERY DAY Hypertension Goal BP (Blood Pressure) < 140/90 Royce Rodríguez MD   metFORMIN (GLUCOPHAGE-XR) 750 MG 24 hr tablet TAKE ONE TABLET BY MOUTH TWICE A DAY WITH MEALS Type 2 diabetes mellitus with stage 3a chronic kidney disease, without long-term current use of insulin (H) Royce Rodríguez MD   Multiple Vitamin (DAILY VITAMINS PO) Take  by mouth daily.  General health Patient Reported   semaglutide (OZEMPIC) 2 MG/3ML pen Inject 0.25 mg subcutaneously every 7 days. Type II Diabetes Mellitus with Complication (H) Royce Rodríguez MD         Add new medications, over-the-counter drugs, herbals, vitamins, or  minerals in the blank rows below.    Medication How I take it Why I use it Prescriber                                      Allergies:      - Nkda [no Known Drug Allergy]        Side effects I have had:      Not on File        Other Information:              My notes and questions:

## 2025-04-22 NOTE — LETTER
April 22, 2025  [address]  [address]  [place] [address]    Dear  JESSICA Dale [facility] EBONY     Thank you for talking with me on Apr 22, 2025 about your health and medications. As a follow-up to our conversation, I have included two documents:      Your Recommended To-Do List has steps you should take to get the best results from your medications.  Your Medication List will help you keep track of your medications and how to take them.    If you want to talk about these documents, please call Richie Jennings RPH at phone: [phone], Monday-Friday 8-4:30pm.    I look forward to working with you and your doctors to make sure your medications work well for you.    Sincerely,  Richie Jennings RPH  [facility] Pharmacist, [facility]

## 2025-04-22 NOTE — PATIENT INSTRUCTIONS
"Recommendations from today's MTM visit:                                                    MTM (medication therapy management) is a service provided by a clinical pharmacist designed to help you get the most of out of your medicines.      Can continue with plan with increasing Ozempic 0.5 mg once a week  Check your blood sugars once in the morning and 2 hours after largest meal. Fasting <130 mg/dL and after meal <180  Look into options like niacin or zetia for cholesterol as these medications can help lower the \"bad cholesterol\"  Start checking blood pressures once a day    Follow-up: Return in about 4 weeks (around 5/20/2025).    It was great speaking with you today.  I value your experience and would be very thankful for your time in providing feedback in our clinic survey. In the next few days, you may receive an email or text message from [employer] with a link to a survey related to your  clinical pharmacist.\"     To schedule another MTM appointment, please call the clinic directly or you may call the MTM scheduling line at [phone] or toll-free at [phone].     My Clinical Pharmacist's contact information:                                                      Please feel free to contact me with any questions or concerns you have.      Richie Jennings, PharmD   "

## 2025-04-22 NOTE — Clinical Note
Met with Miguel today and it seems as he is set to increase Ozempic to 0.5 mg, but I'm not entirely sure if that's a new prescription that needs to be sent. Would you like me to send one? I also discussed with him about his cholesterol and the likely need for a statin given his ASCVD risk, however he does not feel comfortable about starting one. I discussed the options of other things like zetia and niacin, but he said he will reflect on those options for now.

## 2025-04-23 ENCOUNTER — THERAPY VISIT (OUTPATIENT)
Dept: PHYSICAL THERAPY | Facility: CLINIC | Age: 75
End: 2025-04-23
Payer: COMMERCIAL

## 2025-04-23 DIAGNOSIS — M54.50 CHRONIC LEFT-SIDED LOW BACK PAIN WITHOUT SCIATICA: ICD-10-CM

## 2025-04-23 DIAGNOSIS — S39.012A STRAIN OF LUMBAR REGION, INITIAL ENCOUNTER: Primary | ICD-10-CM

## 2025-04-23 DIAGNOSIS — G89.29 CHRONIC LEFT-SIDED LOW BACK PAIN WITHOUT SCIATICA: ICD-10-CM

## 2025-04-23 PROCEDURE — 97110 THERAPEUTIC EXERCISES: CPT | Mod: GP | Performed by: PHYSICAL THERAPIST

## 2025-04-23 NOTE — PROGRESS NOTES
All weight progressions in therapy sessions are dictated by the patient tolerance and therapist discretion. Testing on MedX equipment is completed on 4-week intervals to allow for physiological change in muscle structure and neuromuscular re-education.    Lumbar MedX Initial testing    AROM (full=  0-72  lumbar) 0-51   Max Extension Torque  383   Flex: ext ratio (ideal 1.4:1) 4.04:1     Date 4/23/2025 4/21/2025 4/14/2025 4/9/2025 4/7/2025 3/26   Lumbar Parameters         Top Dead Center (TDC) 21 21 21 21 21 21   Counterbalance (CB) 251 251 251 251 251 251   Seat Pad 0 0 0 0 0 0   Femur Restraint 5 5 5 5 5 5   Week/Visit         Enter Week/Visit # 3/2 3/1 2/2 2/1 1/2 1/1   Weight (lbs) 141# 141# 138# 135# 130#    Reps (#) 20 20 20 17 17    Time 125 111 160 157 222    ROM (degrees) 0-51 0-51 0-51 0-51 0-51    Pain         Therapist cues           Date 4/23/2025 4/21/2025 4/14/2025 4/9/2025 4/7/2025 3/26   Exercise         Treadmill X 5 min X 5 min  X 5 min  X 5 min  X 5 min     Rotary torso 90 sec X 44#'s started R X 44#'s started L X 44#'s started R X 40#'s started L X 30#'s started R    Hamstring stretch    X 30 sec  1x30 sec/side   Seated flexion X 10    2 X 10 sec  2x10   Prone Extensions X 10    Review X 10 improving motion X 10     Bridge     X 10     Kneeling to chair and supine hip flexor stretch   X 30 sec supine X 30 sec     Reformer 90/90 ab set tap down to bar/reverse crunch    2nd to top notch X 10       Marcelo Chair  X 10        Reformer leg press X 20 all springs X 20 all springs       Reformer hamstring stretch 10 X 2R                                      Modifications instructed by therapist: verbal and visual cues for proper set up, form, and stretch

## 2025-04-28 ENCOUNTER — THERAPY VISIT (OUTPATIENT)
Dept: PHYSICAL THERAPY | Facility: CLINIC | Age: 75
End: 2025-04-28
Payer: COMMERCIAL

## 2025-04-28 DIAGNOSIS — S39.012A STRAIN OF LUMBAR REGION, INITIAL ENCOUNTER: Primary | ICD-10-CM

## 2025-04-28 DIAGNOSIS — G89.29 CHRONIC LEFT-SIDED LOW BACK PAIN WITHOUT SCIATICA: ICD-10-CM

## 2025-04-28 DIAGNOSIS — M54.50 CHRONIC LEFT-SIDED LOW BACK PAIN WITHOUT SCIATICA: ICD-10-CM

## 2025-04-28 PROCEDURE — 97110 THERAPEUTIC EXERCISES: CPT | Mod: GP | Performed by: PHYSICAL THERAPIST

## 2025-04-28 NOTE — PROGRESS NOTES
All weight progressions in therapy sessions are dictated by the patient tolerance and therapist discretion. Testing on MedX equipment is completed on 4-week intervals to allow for physiological change in muscle structure and neuromuscular re-education.    Lumbar MedX Initial testing    AROM (full=  0-72  lumbar) 0-51   Max Extension Torque  383   Flex: ext ratio (ideal 1.4:1) 4.04:1     Date 4/28/2025 4/23/2025 4/21/2025 4/14/2025 4/9/2025 4/7/2025 3/26   Lumbar Parameters          Top Dead Center (TDC) 21 21 21 21 21 21 21   Counterbalance (CB) 251 251 251 251 251 251 251   Seat Pad 0 0 0 0 0 0 0   Femur Restraint 5 5 5 5 5 5 5   Week/Visit          Enter Week/Visit # 4/1 3/2 3/1 2/2 2/1 1/2 1/1   Weight (lbs) 143# 141# 141# 138# 135# 130#    Reps (#) 20 20 20 20 17 17    Time 117 125 111 160 157 222    ROM (degrees) 0-51 0-51 0-51 0-51 0-51 0-51    Pain          Therapist cues            Date 4/28/2025 4/23/2025 4/21/2025 4/14/2025 4/9/2025 4/7/2025 3/26   Exercise          Treadmill X 5 min  X 5 min X 5 min  X 5 min  X 5 min  X 5 min     Rotary torso 90 sec X 46#'s started L X 44#'s started R X 44#'s started L X 44#'s started R X 40#'s started L X 30#'s started R    Hamstring stretch     X 30 sec  1x30 sec/side   Seated flexion X 10  X 10    2 X 10 sec  2x10   Prone Extensions X 10 X 10    Review X 10 improving motion X 10     Bridge      X 10     Kneeling to chair and supine hip flexor stretch    X 30 sec supine X 30 sec     Reformer 90/90 ab set tap down to bar/reverse crunch  2nd Notch X 10    2nd to top notch X 10       Marcelo Chair   X 10        Reformer leg press X 20 all springs X 20 all springs X 20 all springs       Reformer hamstring stretch  10 X 2R                                         Modifications instructed by therapist: verbal and visual cues for proper set up, form, and stretch

## 2025-04-30 ENCOUNTER — THERAPY VISIT (OUTPATIENT)
Dept: PHYSICAL THERAPY | Facility: CLINIC | Age: 75
End: 2025-04-30
Payer: COMMERCIAL

## 2025-04-30 DIAGNOSIS — M54.50 CHRONIC LEFT-SIDED LOW BACK PAIN WITHOUT SCIATICA: ICD-10-CM

## 2025-04-30 DIAGNOSIS — S39.012A STRAIN OF LUMBAR REGION, INITIAL ENCOUNTER: Primary | ICD-10-CM

## 2025-04-30 DIAGNOSIS — G89.29 CHRONIC LEFT-SIDED LOW BACK PAIN WITHOUT SCIATICA: ICD-10-CM

## 2025-04-30 PROCEDURE — 97110 THERAPEUTIC EXERCISES: CPT | Mod: GP | Performed by: PHYSICAL THERAPIST

## 2025-04-30 NOTE — PROGRESS NOTES
All weight progressions in therapy sessions are dictated by the patient tolerance and therapist discretion. Testing on MedX equipment is completed on 4-week intervals to allow for physiological change in muscle structure and neuromuscular re-education.    Lumbar MedX 4/30/2025 Initial testing    AROM (full=  0-72  lumbar) 0-51 0-51   Max Extension Torque  360# 383   Flex: ext ratio (ideal 1.4:1) 1.90:1 4.04:1     Date 4/30/2025 4/28/2025 4/23/2025 4/21/2025 4/14/2025 4/9/2025 4/7/2025 3/26   Lumbar Parameters           Top Dead Center (TDC) 21 21 21 21 21 21 21 21   Counterbalance (CB) 251 251 251 251 251 251 251 251   Seat Pad 0 0 0 0 0 0 0 0   Femur Restraint 5 5 5 5 5 5 5 5   Week/Visit           Enter Week/Visit # 4/2 4/1 3/2 3/1 2/2 2/1 1/2 1/1   Weight (lbs) 147# 143# 141# 141# 138# 135# 130#    Reps (#) 20 20 20 20 20 17 17    Time 107 117 125 111 160 157 222    ROM (degrees) 0-51 0-51 0-51 0-51 0-51 0-51 0-51    Pain           Therapist cues             Date 4/30/2025 4/28/2025 4/23/2025 4/21/2025 4/14/2025 4/9/2025 4/7/2025 3/26   Exercise           Treadmill X 5 min  X 5 min  X 5 min X 5 min  X 5 min  X 5 min  X 5 min     Rotary torso 90 sec X 46#'s started R X 46#'s started L X 44#'s started R X 44#'s started L X 44#'s started R X 40#'s started L X 30#'s started R    Hamstring stretch      X 30 sec  1x30 sec/side   Seated flexion  X 10  X 10    2 X 10 sec  2x10   Prone Extensions X 10  X 10 X 10    Review X 10 improving motion X 10     Bridge       X 10     Kneeling to chair and supine hip flexor stretch     X 30 sec supine X 30 sec     Reformer 90/90 ab set tap down to bar/reverse crunch   2nd Notch X 10    2nd to top notch X 10       Marcelo Chair    X 10        Reformer leg press  X 20 all springs X 20 all springs X 20 all springs       Reformer hamstring stretch   10 X 2R                                            Modifications instructed by therapist: verbal and visual cues for proper set up, form, and  stretch

## 2025-05-05 ENCOUNTER — THERAPY VISIT (OUTPATIENT)
Dept: PHYSICAL THERAPY | Facility: CLINIC | Age: 75
End: 2025-05-05
Payer: COMMERCIAL

## 2025-05-05 DIAGNOSIS — M54.50 CHRONIC LEFT-SIDED LOW BACK PAIN WITHOUT SCIATICA: ICD-10-CM

## 2025-05-05 DIAGNOSIS — S39.012A STRAIN OF LUMBAR REGION, INITIAL ENCOUNTER: Primary | ICD-10-CM

## 2025-05-05 DIAGNOSIS — G89.29 CHRONIC LEFT-SIDED LOW BACK PAIN WITHOUT SCIATICA: ICD-10-CM

## 2025-05-05 PROCEDURE — 97110 THERAPEUTIC EXERCISES: CPT | Mod: GP | Performed by: PHYSICAL THERAPIST

## 2025-05-05 NOTE — PROGRESS NOTES
All weight progressions in therapy sessions are dictated by the patient tolerance and therapist discretion. Testing on MedX equipment is completed on 4-week intervals to allow for physiological change in muscle structure and neuromuscular re-education.    Lumbar MedX 4/30/2025 Initial testing    AROM (full=  0-72  lumbar) 0-51 0-51   Max Extension Torque  360# 383   Flex: ext ratio (ideal 1.4:1) 1.90:1 4.04:1     Date 5/5/2025 4/30/2025 4/28/2025 4/23/2025 4/21/2025 4/14/2025 4/9/2025 4/7/2025 3/26   Lumbar Parameters            Top Dead Center (TDC) 21 21 21 21 21 21 21 21 21   Counterbalance (CB) 251 251 251 251 251 251 251 251 251   Seat Pad 0 0 0 0 0 0 0 0 0   Femur Restraint 5 5 5 5 5 5 5 5 5   Week/Visit            Enter Week/Visit # 5/1 4/2 4/1 3/2 3/1 2/2 2/1 1/2 1/1   Weight (lbs) 150# 147# 143# 141# 141# 138# 135# 130#    Reps (#) 16 20 20 20 20 20 17 17    Time 67 107 117 125 111 160 157 222    ROM (degrees) 0-51 0-51 0-51 0-51 0-51 0-51 0-51 0-51    Pain            Therapist cues              Date 5/5/2025 4/30/2025 4/28/2025 4/23/2025 4/21/2025 4/14/2025 4/9/2025 4/7/2025 3/26   Exercise            Treadmill X 5 min X 5 min  X 5 min  X 5 min X 5 min  X 5 min  X 5 min  X 5 min     Rotary torso 90 sec X 50#'s started L X 46#'s started R X 46#'s started L X 44#'s started R X 44#'s started L X 44#'s started R X 40#'s started L X 30#'s started R    Hamstring stretch       X 30 sec  1x30 sec/side   Seated flexion   X 10  X 10    2 X 10 sec  2x10   Prone Extensions  X 10  X 10 X 10    Review X 10 improving motion X 10     Bridge        X 10     Kneeling to chair and supine hip flexor stretch      X 30 sec supine X 30 sec     Reformer 90/90 ab set tap down to bar/reverse crunch    2nd Notch X 10    2nd to top notch X 10       Marcelo Chair     X 10        Reformer leg press X 20 all springs  X 20 all springs X 20 all springs X 20 all springs       Reformer hamstring stretch X 10 X 2R   10 X 2R         Reformer  trunk rotation X 10 1R 1B                                     Modifications instructed by therapist: verbal and visual cues for proper set up, form, and stretch

## 2025-05-07 ENCOUNTER — THERAPY VISIT (OUTPATIENT)
Dept: PHYSICAL THERAPY | Facility: CLINIC | Age: 75
End: 2025-05-07
Payer: COMMERCIAL

## 2025-05-07 DIAGNOSIS — G89.29 CHRONIC LEFT-SIDED LOW BACK PAIN WITHOUT SCIATICA: ICD-10-CM

## 2025-05-07 DIAGNOSIS — M54.50 CHRONIC LEFT-SIDED LOW BACK PAIN WITHOUT SCIATICA: ICD-10-CM

## 2025-05-07 DIAGNOSIS — S39.012A STRAIN OF LUMBAR REGION, INITIAL ENCOUNTER: Primary | ICD-10-CM

## 2025-05-07 PROCEDURE — 97110 THERAPEUTIC EXERCISES: CPT | Mod: GP | Performed by: PHYSICAL THERAPIST

## 2025-05-07 NOTE — PROGRESS NOTES
All weight progressions in therapy sessions are dictated by the patient tolerance and therapist discretion. Testing on MedX equipment is completed on 4-week intervals to allow for physiological change in muscle structure and neuromuscular re-education.    Lumbar MedX 4/30/2025 Initial testing    AROM (full=  0-72  lumbar) 0-51 0-51   Max Extension Torque  360# 383   Flex: ext ratio (ideal 1.4:1) 1.90:1 4.04:1     Date 5/7/2025 5/5/2025 4/30/2025 4/28/2025 4/23/2025 4/21/2025 4/14/2025 4/9/2025 4/7/2025 3/26   Lumbar Parameters             Top Dead Center (TDC) 21 21 21 21 21 21 21 21 21 21   Counterbalance (CB) 251 251 251 251 251 251 251 251 251 251   Seat Pad 0 0 0 0 0 0 0 0 0 0   Femur Restraint 5 5 5 5 5 5 5 5 5 5   Week/Visit             Enter Week/Visit # 6/1 5/1 4/2 4/1 3/2 3/1 2/2 2/1 1/2 1/1   Weight (lbs) 150# 150# 147# 143# 141# 141# 138# 135# 130#    Reps (#) 20 16 20 20 20 20 20 17 17    Time 117 67 107 117 125 111 160 157 222    ROM (degrees) 0-51 0-51 0-51 0-51 0-51 0-51 0-51 0-51 0-51    Pain             Therapist cues               Date 5/7/2025 5/5/2025 4/30/2025 4/28/2025 4/23/2025 4/21/2025 4/14/2025 4/9/2025 4/7/2025 3/26   Exercise             Treadmill X 5 min  X 5 min X 5 min  X 5 min  X 5 min X 5 min  X 5 min  X 5 min  X 5 min     Rotary torso 90 sec X 50#'s started R X 50#'s started L X 46#'s started R X 46#'s started L X 44#'s started R X 44#'s started L X 44#'s started R X 40#'s started L X 30#'s started R    Hamstring stretch        X 30 sec  1x30 sec/side   Seated flexion X 10    X 10  X 10    2 X 10 sec  2x10   Prone Extensions   X 10  X 10 X 10    Review X 10 improving motion X 10     Bridge         X 10     Kneeling to chair and supine hip flexor stretch       X 30 sec supine X 30 sec     Reformer 90/90 ab set tap down to bar/reverse crunch     2nd Notch X 10    2nd to top notch X 10       Marcelo Chair      X 10        Reformer leg press X 20 all springs X 20 all springs  X 20 all  Not candidate for mitral clip, last echo EF 55%  -Hold home torsemide and farxiga for ANN   springs X 20 all springs X 20 all springs       Reformer hamstring stretch  X 10 X 2R   10 X 2R         Reformer trunk rotation X 10 1R 1B X 10 1R 1B           Squats  X 10                            Modifications instructed by therapist: verbal and visual cues for proper set up, form, and stretch

## 2025-05-12 ENCOUNTER — THERAPY VISIT (OUTPATIENT)
Dept: PHYSICAL THERAPY | Facility: CLINIC | Age: 75
End: 2025-05-12
Payer: COMMERCIAL

## 2025-05-12 DIAGNOSIS — S39.012A STRAIN OF LUMBAR REGION, INITIAL ENCOUNTER: Primary | ICD-10-CM

## 2025-05-12 DIAGNOSIS — G89.29 CHRONIC LEFT-SIDED LOW BACK PAIN WITHOUT SCIATICA: ICD-10-CM

## 2025-05-12 DIAGNOSIS — M54.50 CHRONIC LEFT-SIDED LOW BACK PAIN WITHOUT SCIATICA: ICD-10-CM

## 2025-05-12 PROCEDURE — 97110 THERAPEUTIC EXERCISES: CPT | Mod: GP | Performed by: PHYSICAL THERAPIST

## 2025-05-12 NOTE — PROGRESS NOTES
All weight progressions in therapy sessions are dictated by the patient tolerance and therapist discretion. Testing on MedX equipment is completed on 4-week intervals to allow for physiological change in muscle structure and neuromuscular re-education.    Lumbar MedX 4/30/2025 Initial testing    AROM (full=  0-72  lumbar) 0-51 0-51   Max Extension Torque  360# 383   Flex: ext ratio (ideal 1.4:1) 1.90:1 4.04:1     Date 5/12/2025 5/7/2025 5/5/2025 4/30/2025 4/28/2025 4/23/2025 4/21/2025 4/14/2025 4/9/2025 4/7/2025 3/26   Lumbar Parameters              Top Dead Center (TDC) 21 21 21 21 21 21 21 21 21 21 21   Counterbalance (CB) 251 251 251 251 251 251 251 251 251 251 251   Seat Pad 0 0 0 0 0 0 0 0 0 0 0   Femur Restraint 5 5 5 5 5 5 5 5 5 5 5   Week/Visit              Enter Week/Visit # 7/1 6/1 5/1 4/2 4/1 3/2 3/1 2/2 2/1 1/2 1/1   Weight (lbs) 153# 150# 150# 147# 143# 141# 141# 138# 135# 130#    Reps (#) 20 20 16 20 20 20 20 20 17 17    Time 117 117 67 107 117 125 111 160 157 222    ROM (degrees) 0-51 0-51 0-51 0-51 0-51 0-51 0-51 0-51 0-51 0-51    Pain              Therapist cues                Date 5/12/2025 5/7/2025 5/5/2025 4/30/2025 4/28/2025 4/23/2025 4/21/2025 4/14/2025 4/9/2025 4/7/2025 3/26   Exercise              Treadmill X 5 min  X 5 min  X 5 min X 5 min  X 5 min  X 5 min X 5 min  X 5 min  X 5 min  X 5 min     Rotary torso 90 sec X 50 started L X 50#'s started R X 50#'s started L X 46#'s started R X 46#'s started L X 44#'s started R X 44#'s started L X 44#'s started R X 40#'s started L X 30#'s started R    Hamstring stretch         X 30 sec  1x30 sec/side   Seated flexion  X 10    X 10  X 10    2 X 10 sec  2x10   Prone Extensions X 10    X 10  X 10 X 10    Review X 10 improving motion X 10     Bridge          X 10     Kneeling to chair and supine hip flexor stretch        X 30 sec supine X 30 sec     Reformer 90/90 ab set tap down to bar/reverse crunch      2nd Notch X 10    2nd to top notch X 10        Marcelo Chair       X 10        Reformer leg press X 20 all springs X 20 all springs X 20 all springs  X 20 all springs X 20 all springs X 20 all springs       Reformer hamstring stretch   X 10 X 2R   10 X 2R         Reformer trunk rotation X 10 1R1B X 10 1R 1B X 10 1R 1B           Squats  X 10  X 10                             Modifications instructed by therapist: verbal and visual cues for proper set up, form, and stretch

## 2025-05-19 ENCOUNTER — THERAPY VISIT (OUTPATIENT)
Dept: PHYSICAL THERAPY | Facility: CLINIC | Age: 75
End: 2025-05-19
Payer: COMMERCIAL

## 2025-05-19 DIAGNOSIS — M54.50 CHRONIC LEFT-SIDED LOW BACK PAIN WITHOUT SCIATICA: ICD-10-CM

## 2025-05-19 DIAGNOSIS — G89.29 CHRONIC LEFT-SIDED LOW BACK PAIN WITHOUT SCIATICA: ICD-10-CM

## 2025-05-19 DIAGNOSIS — S39.012A STRAIN OF LUMBAR REGION, INITIAL ENCOUNTER: Primary | ICD-10-CM

## 2025-05-19 PROCEDURE — 97110 THERAPEUTIC EXERCISES: CPT | Mod: GP | Performed by: PHYSICAL THERAPIST

## 2025-05-19 NOTE — PROGRESS NOTES
All weight progressions in therapy sessions are dictated by the patient tolerance and therapist discretion. Testing on MedX equipment is completed on 4-week intervals to allow for physiological change in muscle structure and neuromuscular re-education.    Lumbar MedX 4/30/2025 Initial testing    AROM (full=  0-72  lumbar) 0-51 0-51   Max Extension Torque  360# 383   Flex: ext ratio (ideal 1.4:1) 1.90:1 4.04:1     Date 5/19/2025 5/12/2025 5/7/2025 5/5/2025 4/30/2025 4/28/2025 4/23/2025 4/21/2025 4/14/2025 4/9/2025 4/7/2025 3/26   Lumbar Parameters               Top Dead Center (TDC) 21 21 21 21 21 21 21 21 21 21 21 21   Counterbalance (CB) 251 251 251 251 251 251 251 251 251 251 251 251   Seat Pad 0 0 0 0 0 0 0 0 0 0 0 0   Femur Restraint 5 5 5 5 5 5 5 5 5 5 5 5   Week/Visit               Enter Week/Visit # 8/1 7/1 6/1 5/1 4/2 4/1 3/2 3/1 2/2 2/1 1/2 1/1   Weight (lbs) 153# 153# 150# 150# 147# 143# 141# 141# 138# 135# 130#    Reps (#) 20 20 20 16 20 20 20 20 20 17 17    Time - 117 117 67 107 117 125 111 160 157 222    ROM (degrees) 0-51 0-51 0-51 0-51 0-51 0-51 0-51 0-51 0-51 0-51 0-51    Pain               Therapist cues                 Date 5/19/2025 5/12/2025 5/7/2025 5/5/2025 4/30/2025 4/28/2025 4/23/2025 4/21/2025 4/14/2025 4/9/2025 4/7/2025 3/26   Exercise               Treadmill X 5 min X 5 min  X 5 min  X 5 min X 5 min  X 5 min  X 5 min X 5 min  X 5 min  X 5 min  X 5 min     Rotary torso 90 sec X 50#'s started R X 50 started L X 50#'s started R X 50#'s started L X 46#'s started R X 46#'s started L X 44#'s started R X 44#'s started L X 44#'s started R X 40#'s started L X 30#'s started R    Hamstring stretch          X 30 sec  1x30 sec/side   Seated flexion   X 10    X 10  X 10    2 X 10 sec  2x10   Prone Extensions  X 10    X 10  X 10 X 10    Review X 10 improving motion X 10     Bridge           X 10     Kneeling to chair and supine hip flexor stretch         X 30 sec supine X 30 sec     Reformer 90/90 ab set  tap down to bar/reverse crunch       2nd Notch X 10    2nd to top notch X 10       Marcelo Chair        X 10        Reformer leg press X 20 all springs X 20 all springs X 20 all springs X 20 all springs  X 20 all springs X 20 all springs X 20 all springs       Reformer hamstring stretch    X 10 X 2R   10 X 2R         Reformer trunk rotation X 10 1R1B X 10 1R1B X 10 1R 1B X 10 1R 1B           Squats   X 10  X 10                              Modifications instructed by therapist: verbal and visual cues for proper set up, form, and stretch    none

## 2025-05-28 ENCOUNTER — THERAPY VISIT (OUTPATIENT)
Dept: PHYSICAL THERAPY | Facility: CLINIC | Age: 75
End: 2025-05-28
Payer: COMMERCIAL

## 2025-05-28 DIAGNOSIS — S39.012A STRAIN OF LUMBAR REGION, INITIAL ENCOUNTER: Primary | ICD-10-CM

## 2025-05-28 DIAGNOSIS — M54.50 CHRONIC LEFT-SIDED LOW BACK PAIN WITHOUT SCIATICA: ICD-10-CM

## 2025-05-28 DIAGNOSIS — G89.29 CHRONIC LEFT-SIDED LOW BACK PAIN WITHOUT SCIATICA: ICD-10-CM

## 2025-05-28 PROCEDURE — 97110 THERAPEUTIC EXERCISES: CPT | Mod: GP | Performed by: PHYSICAL THERAPIST

## 2025-05-28 NOTE — PROGRESS NOTES
All weight progressions in therapy sessions are dictated by the patient tolerance and therapist discretion. Testing on MedX equipment is completed on 4-week intervals to allow for physiological change in muscle structure and neuromuscular re-education.    Lumbar MedX 4/30/2025 Initial testing    AROM (full=  0-72  lumbar) 0-51 0-51   Max Extension Torque  360# 383   Flex: ext ratio (ideal 1.4:1) 1.90:1 4.04:1     Date 5/28/2025 5/19/2025 5/12/2025 5/7/2025 5/5/2025 4/30/2025 4/28/2025 4/23/2025 4/21/2025 4/14/2025 4/9/2025 4/7/2025 3/26   Lumbar Parameters                Top Dead Center (TDC) 21 21 21 21 21 21 21 21 21 21 21 21 21   Counterbalance (CB) 251 251 251 251 251 251 251 251 251 251 251 251 251   Seat Pad 0 0 0 0 0 0 0 0 0 0 0 0 0   Femur Restraint 5 5 5 5 5 5 5 5 5 5 5 5 5   Week/Visit                Enter Week/Visit # 9/1 8/1 7/1 6/1 5/1 4/2 4/1 3/2 3/1 2/2 2/1 1/2 1/1   Weight (lbs) 155# 153# 153# 150# 150# 147# 143# 141# 141# 138# 135# 130#    Reps (#) 20 20 20 20 16 20 20 20 20 20 17 17    Time 90 - 117 117 67 107 117 125 111 160 157 222    ROM (degrees) 0-51 0-51 0-51 0-51 0-51 0-51 0-51 0-51 0-51 0-51 0-51 0-51    Pain                Therapist cues                  Date 5/28/2025 5/19/2025 5/12/2025 5/7/2025 5/5/2025 4/30/2025 4/28/2025 4/23/2025 4/21/2025 4/14/2025 4/9/2025 4/7/2025 3/26   Exercise                Treadmill X 5 min  X 5 min X 5 min  X 5 min  X 5 min X 5 min  X 5 min  X 5 min X 5 min  X 5 min  X 5 min  X 5 min     Rotary torso 90 sec X 52#'s started L X 50#'s started R X 50 started L X 50#'s started R X 50#'s started L X 46#'s started R X 46#'s started L X 44#'s started R X 44#'s started L X 44#'s started R X 40#'s started L X 30#'s started R    Hamstring stretch           X 30 sec  1x30 sec/side   Seated flexion    X 10    X 10  X 10    2 X 10 sec  2x10   Prone Extensions X 10   X 10    X 10  X 10 X 10    Review X 10 improving motion X 10     Bridge            X 10     Kneeling to  chair and supine hip flexor stretch          X 30 sec supine X 30 sec     Reformer 90/90 ab set tap down to bar/reverse crunch        2nd Notch X 10    2nd to top notch X 10       Marcelo Chair         X 10        Reformer leg press  X 20 all springs X 20 all springs X 20 all springs X 20 all springs  X 20 all springs X 20 all springs X 20 all springs       Reformer hamstring stretch     X 10 X 2R   10 X 2R         Reformer trunk rotation X 12 1R1B X 10 1R1B X 10 1R1B X 10 1R 1B X 10 1R 1B           Squats    X 10  X 10                               Modifications instructed by therapist: verbal and visual cues for proper set up, form, and stretch

## 2025-06-02 ENCOUNTER — THERAPY VISIT (OUTPATIENT)
Dept: PHYSICAL THERAPY | Facility: CLINIC | Age: 75
End: 2025-06-02
Payer: COMMERCIAL

## 2025-06-02 DIAGNOSIS — S39.012A STRAIN OF LUMBAR REGION, INITIAL ENCOUNTER: Primary | ICD-10-CM

## 2025-06-02 DIAGNOSIS — M54.50 CHRONIC LEFT-SIDED LOW BACK PAIN WITHOUT SCIATICA: ICD-10-CM

## 2025-06-02 DIAGNOSIS — G89.29 CHRONIC LEFT-SIDED LOW BACK PAIN WITHOUT SCIATICA: ICD-10-CM

## 2025-06-02 PROCEDURE — 97110 THERAPEUTIC EXERCISES: CPT | Mod: GP | Performed by: PHYSICAL THERAPIST

## 2025-06-02 NOTE — PROGRESS NOTES
All weight progressions in therapy sessions are dictated by the patient tolerance and therapist discretion. Testing on MedX equipment is completed on 4-week intervals to allow for physiological change in muscle structure and neuromuscular re-education.    Lumbar MedX 4/30/2025 Initial testing    AROM (full=  0-72  lumbar) 0-51 0-51   Max Extension Torque  360# 383   Flex: ext ratio (ideal 1.4:1) 1.90:1 4.04:1     Date 6/2/2025 5/28/2025 5/19/2025 5/12/2025 5/7/2025 5/5/2025 4/30/2025 4/28/2025 4/23/2025 4/21/2025 4/14/2025 4/9/2025 4/7/2025 3/26   Lumbar Parameters                 Top Dead Center (TDC) 21 21 21 21 21 21 21 21 21 21 21 21 21 21   Counterbalance (CB) 251 251 251 251 251 251 251 251 251 251 251 251 251 251   Seat Pad 0 0 0 0 0 0 0 0 0 0 0 0 0 0   Femur Restraint 5 5 5 5 5 5 5 5 5 5 5 5 5 5   Week/Visit                 Enter Week/Visit # 10/1 9/1 8/1 7/1 6/1 5/1 4/2 4/1 3/2 3/1 2/2 2/1 1/2 1/1   Weight (lbs) 156# 155# 153# 153# 150# 150# 147# 143# 141# 141# 138# 135# 130#    Reps (#) 18 20 20 20 20 16 20 20 20 20 20 17 17    Time 74 90 - 117 117 67 107 117 125 111 160 157 222    ROM (degrees) 0-51 0-51 0-51 0-51 0-51 0-51 0-51 0-51 0-51 0-51 0-51 0-51 0-51    Pain                 Therapist cues                   Date 6/2/2025 5/28/2025 5/19/2025 5/12/2025 5/7/2025 5/5/2025 4/30/2025 4/28/2025 4/23/2025 4/21/2025 4/14/2025 4/9/2025 4/7/2025 3/26   Exercise                 Treadmill X 5 min  X 5 min  X 5 min X 5 min  X 5 min  X 5 min X 5 min  X 5 min  X 5 min X 5 min  X 5 min  X 5 min  X 5 min     Rotary torso 90 sec X 54#'s started R X 52#'s started L X 50#'s started R X 50 started L X 50#'s started R X 50#'s started L X 46#'s started R X 46#'s started L X 44#'s started R X 44#'s started L X 44#'s started R X 40#'s started L X 30#'s started R    Hamstring stretch            X 30 sec  1x30 sec/side   Seated flexion     X 10    X 10  X 10    2 X 10 sec  2x10   Prone Extensions X 10  X 10   X 10    X 10  X  10 X 10    Review X 10 improving motion X 10     Bridge             X 10     Kneeling to chair and supine hip flexor stretch           X 30 sec supine X 30 sec     Reformer 90/90 ab set tap down to bar/reverse crunch         2nd Notch X 10    2nd to top notch X 10       Marcelo Chair          X 10        Reformer leg press   X 20 all springs X 20 all springs X 20 all springs X 20 all springs  X 20 all springs X 20 all springs X 20 all springs       Reformer hamstring stretch      X 10 X 2R   10 X 2R         Reformer trunk rotation X 12 1R 1B X 12 1R1B X 10 1R1B X 10 1R1B X 10 1R 1B X 10 1R 1B           Squats     X 10  X 10                                Modifications instructed by therapist: verbal and visual cues for proper set up, form, and stretch

## 2025-06-03 DIAGNOSIS — I10 HYPERTENSION GOAL BP (BLOOD PRESSURE) < 140/90: ICD-10-CM

## 2025-06-03 RX ORDER — LISINOPRIL 10 MG/1
10 TABLET ORAL DAILY
Qty: 90 TABLET | Refills: 1 | Status: SHIPPED | OUTPATIENT
Start: 2025-06-03

## 2025-06-04 ENCOUNTER — MYC REFILL (OUTPATIENT)
Dept: FAMILY MEDICINE | Facility: CLINIC | Age: 75
End: 2025-06-04
Payer: COMMERCIAL

## 2025-06-04 DIAGNOSIS — E11.8 TYPE II DIABETES MELLITUS WITH COMPLICATION (H): ICD-10-CM

## 2025-06-10 ENCOUNTER — OFFICE VISIT (OUTPATIENT)
Dept: PHARMACY | Facility: CLINIC | Age: 75
End: 2025-06-10
Payer: COMMERCIAL

## 2025-06-10 DIAGNOSIS — I10 ESSENTIAL HYPERTENSION: ICD-10-CM

## 2025-06-10 DIAGNOSIS — E11.9 TYPE 2 DIABETES MELLITUS WITHOUT COMPLICATION, WITHOUT LONG-TERM CURRENT USE OF INSULIN (H): Primary | ICD-10-CM

## 2025-06-10 PROCEDURE — 99606 MTMS BY PHARM EST 15 MIN: CPT

## 2025-06-10 PROCEDURE — 99607 MTMS BY PHARM ADDL 15 MIN: CPT

## 2025-06-10 RX ORDER — ACETAMINOPHEN 325 MG/1
325-650 TABLET ORAL EVERY 6 HOURS PRN
COMMUNITY

## 2025-06-10 RX ORDER — NAPROXEN SODIUM 220 MG/1
220 TABLET, FILM COATED ORAL PRN
COMMUNITY

## 2025-06-10 NOTE — PATIENT INSTRUCTIONS
"Recommendations from today's MTM visit:                                                         Start checking blood sugar first thing in the morning (goal of <130 mg/dL)  Increase Ozempic to 0.5 mg once a week tonight  or tomorrow  Schedule an A1c at the nearest available time for you    Follow-up: Return in about 4 weeks (around 7/8/2025).    It was great speaking with you today.  I value your experience and would be very thankful for your time in providing feedback in our clinic survey. In the next few days, you may receive an email or text message from [employer] with a link to a survey related to your  clinical pharmacist.\"     To schedule another MTM appointment, please call the clinic directly or you may call the MTM scheduling line at [phone] or toll-free at [phone].     My Clinical Pharmacist's contact information:                                                      Please feel free to contact me with any questions or concerns you have.      Richie Jennings, PharmD   "

## 2025-06-10 NOTE — Clinical Note
Met with Timothy today and agreed that he should start ozempic 0.5 mg to help with weight and sugars

## 2025-06-10 NOTE — PROGRESS NOTES
Medication Therapy Management (MTM) Encounter    ASSESSMENT:                            Medication Adherence/Access: No issues identified.    Diabetes  Patient is not meeting A1c goal of < 7%.  Self monitoring of blood glucose is at goal of post prandial < 180 mg/dL.  Patient would benefit from checking blood sugars in the morning to help provide a more detailed assessment of his blood sugars. There is currently a plan with Timothy increasing his Ozempic to 0.5 mg once a week starting either today or tomorrow as this will help reduce sugars and manage weight. PharmD agrees with this plan.      Hypertension   Stable. Patient is meeting blood pressure goal of < 130/80mmHg.     PLAN:                            Start checking blood sugar first thing in the morning (goal of <130 mg/dL)  Increase Ozempic to 0.5 mg once a week tonight  or tomorrow  Schedule an A1c at the nearest available time for you    Follow-up: Return in about 4 weeks (around 7/8/2025).    SUBJECTIVE/OBJECTIVE:                          Timothy Dale is a 75 year old male seen for a follow-up visit.       Reason for visit: Medication therapy management.    Allergies/ADRs: Reviewed in chart  Past Medical History: Reviewed in chart  Tobacco: He reports that he quit smoking about 45 years ago. His smoking use included cigarettes. He started smoking about 52 years ago. He has a 7 pack-year smoking history. He has never used smokeless tobacco.  Alcohol: not currently using    Medication Adherence/Access: no issues reported.    Diabetes   -Metformin 750 mg once in the morning and once at night  -Ozempic 0.25 mg once a week  -Aspirin 81mg daily    Is using Ozempic for benefit of weight loss, his reported weight as of today is 204 pounds and has a goal of 170 pounds. No side effects reported    Had tooth extraction which prevented his plan of increasing ozempic to 0.5 mg once a week.    Current diabetes symptoms: none  Diet/Exercise: Has fruits, yogurt, and  granola for breakfast. Lunch and dinner have been more challenging as he typically has larger portion sizes. Ozempic has helped control portion sizes. If he does have some carbohydrate he ensures to have protein alongside it. Has been doing physical therapy, twice a week. Goes to [employer] and [employer] work, in addition. Has had more portion control       Blood sugar monitorin time(s) daily; Ranges: (patient reported)   Post-Prandial- 172 30 minutes after dinner, usually around that range  Eye exam is up to date  Foot exam: due    Hypertension   -Lisinopril 10 mg once a day    Patient reports no current medication side effects  Patient does not self-monitor blood pressure.         Today's Vitals: There were no vitals taken for this visit.  ----------------      I spent 15 minutes with this patient today.     A summary of these recommendations was sent via [employer].    Richie Jennings, PharmD         Medication Therapy Recommendations  No medication therapy recommendations to display

## 2025-06-18 ENCOUNTER — RESULTS FOLLOW-UP (OUTPATIENT)
Dept: FAMILY MEDICINE | Facility: CLINIC | Age: 75
End: 2025-06-18

## 2025-06-18 ENCOUNTER — LAB (OUTPATIENT)
Dept: LAB | Facility: CLINIC | Age: 75
End: 2025-06-18
Payer: COMMERCIAL

## 2025-06-18 DIAGNOSIS — N17.9 AKI (ACUTE KIDNEY INJURY): ICD-10-CM

## 2025-06-18 DIAGNOSIS — Z13.6 SCREENING FOR CARDIOVASCULAR CONDITION: Primary | ICD-10-CM

## 2025-06-18 DIAGNOSIS — N18.30 CKD (CHRONIC KIDNEY DISEASE) STAGE 3, GFR 30-59 ML/MIN (H): ICD-10-CM

## 2025-06-18 DIAGNOSIS — N18.31 STAGE 3A CHRONIC KIDNEY DISEASE (H): ICD-10-CM

## 2025-06-18 DIAGNOSIS — E11.8 TYPE II DIABETES MELLITUS WITH COMPLICATION (H): Primary | ICD-10-CM

## 2025-06-18 DIAGNOSIS — I10 ESSENTIAL HYPERTENSION: ICD-10-CM

## 2025-06-18 DIAGNOSIS — E11.8 TYPE II DIABETES MELLITUS WITH COMPLICATION (H): ICD-10-CM

## 2025-06-18 DIAGNOSIS — E11.9 TYPE 2 DIABETES MELLITUS WITHOUT COMPLICATION, WITHOUT LONG-TERM CURRENT USE OF INSULIN (H): ICD-10-CM

## 2025-06-18 LAB
ALBUMIN SERPL BCG-MCNC: 4.4 G/DL (ref 3.5–5.2)
ALP SERPL-CCNC: 68 U/L (ref 40–150)
ALT SERPL W P-5'-P-CCNC: 37 U/L (ref 0–70)
ANION GAP SERPL CALCULATED.3IONS-SCNC: 14 MMOL/L (ref 7–15)
AST SERPL W P-5'-P-CCNC: 27 U/L (ref 0–45)
BILIRUB SERPL-MCNC: 0.5 MG/DL
BUN SERPL-MCNC: 37.4 MG/DL (ref 8–23)
CALCIUM SERPL-MCNC: 9.8 MG/DL (ref 8.8–10.4)
CHLORIDE SERPL-SCNC: 102 MMOL/L (ref 98–107)
CHOLEST SERPL-MCNC: 188 MG/DL
CREAT SERPL-MCNC: 1.63 MG/DL (ref 0.67–1.17)
EGFRCR SERPLBLD CKD-EPI 2021: 43 ML/MIN/1.73M2
EST. AVERAGE GLUCOSE BLD GHB EST-MCNC: 143 MG/DL
FASTING STATUS PATIENT QL REPORTED: YES
FASTING STATUS PATIENT QL REPORTED: YES
GLUCOSE SERPL-MCNC: 132 MG/DL (ref 70–99)
HBA1C MFR BLD: 6.6 % (ref 0–5.6)
HCO3 SERPL-SCNC: 22 MMOL/L (ref 22–29)
HDLC SERPL-MCNC: 30 MG/DL
LDLC SERPL CALC-MCNC: 117 MG/DL
NONHDLC SERPL-MCNC: 158 MG/DL
POTASSIUM SERPL-SCNC: 5.4 MMOL/L (ref 3.4–5.3)
PROT SERPL-MCNC: 7.1 G/DL (ref 6.4–8.3)
SODIUM SERPL-SCNC: 138 MMOL/L (ref 135–145)
TRIGL SERPL-MCNC: 204 MG/DL

## 2025-06-18 PROCEDURE — 36415 COLL VENOUS BLD VENIPUNCTURE: CPT

## 2025-06-18 PROCEDURE — 80061 LIPID PANEL: CPT

## 2025-06-18 PROCEDURE — 80053 COMPREHEN METABOLIC PANEL: CPT

## 2025-06-18 PROCEDURE — 83036 HEMOGLOBIN GLYCOSYLATED A1C: CPT

## 2025-07-14 ENCOUNTER — DOCUMENTATION ONLY (OUTPATIENT)
Dept: MULTI SPECIALTY CLINIC | Facility: CLINIC | Age: 75
End: 2025-07-14
Payer: COMMERCIAL

## 2025-07-14 DIAGNOSIS — I10 HYPERTENSION GOAL BP (BLOOD PRESSURE) < 140/90: ICD-10-CM

## 2025-07-14 DIAGNOSIS — E55.9 VITAMIN D DEFICIENCY: ICD-10-CM

## 2025-07-14 DIAGNOSIS — N18.31 STAGE 3A CHRONIC KIDNEY DISEASE (H): Primary | ICD-10-CM

## 2025-07-14 NOTE — PROGRESS NOTES
Timothy Dale has an upcoming lab appointment:Patients llab appointment is 15 minutes before appointment with Jillian do we want to cancel the lab appointment or do you want to place orders for before the appointment ?    Future Appointments   Date Time Provider Department Center   7/21/2025  2:30 PM  LAB [facility]   7/21/2025  3:00 PM Lyric Walsh MD [facility]

## 2025-07-21 ENCOUNTER — LAB (OUTPATIENT)
Dept: LAB | Facility: CLINIC | Age: 75
End: 2025-07-21
Attending: INTERNAL MEDICINE
Payer: COMMERCIAL

## 2025-07-21 ENCOUNTER — OFFICE VISIT (OUTPATIENT)
Dept: NEPHROLOGY | Facility: CLINIC | Age: 75
End: 2025-07-21
Attending: FAMILY MEDICINE
Payer: COMMERCIAL

## 2025-07-21 VITALS
HEART RATE: 74 BPM | WEIGHT: 202 LBS | OXYGEN SATURATION: 98 % | SYSTOLIC BLOOD PRESSURE: 138 MMHG | BODY MASS INDEX: 31.88 KG/M2 | DIASTOLIC BLOOD PRESSURE: 78 MMHG

## 2025-07-21 DIAGNOSIS — N18.31 STAGE 3A CHRONIC KIDNEY DISEASE (H): ICD-10-CM

## 2025-07-21 DIAGNOSIS — I10 HYPERTENSION GOAL BP (BLOOD PRESSURE) < 140/90: ICD-10-CM

## 2025-07-21 DIAGNOSIS — R79.89 ELEVATED SERUM CREATININE: Primary | ICD-10-CM

## 2025-07-21 DIAGNOSIS — N17.9 AKI (ACUTE KIDNEY INJURY): ICD-10-CM

## 2025-07-21 DIAGNOSIS — E11.8 TYPE II DIABETES MELLITUS WITH COMPLICATION (H): ICD-10-CM

## 2025-07-21 DIAGNOSIS — E55.9 VITAMIN D DEFICIENCY: ICD-10-CM

## 2025-07-21 LAB
ALBUMIN UR-MCNC: NEGATIVE MG/DL
APPEARANCE UR: CLEAR
BACTERIA #/AREA URNS HPF: ABNORMAL /HPF
BILIRUB UR QL STRIP: NEGATIVE
COLOR UR AUTO: YELLOW
ERYTHROCYTE [DISTWIDTH] IN BLOOD BY AUTOMATED COUNT: 13.3 % (ref 10–15)
GLUCOSE UR STRIP-MCNC: NEGATIVE MG/DL
HCT VFR BLD AUTO: 41.1 % (ref 40–53)
HGB BLD-MCNC: 13.2 G/DL (ref 13.3–17.7)
HGB UR QL STRIP: NEGATIVE
KETONES UR STRIP-MCNC: NEGATIVE MG/DL
LEUKOCYTE ESTERASE UR QL STRIP: NEGATIVE
MCH RBC QN AUTO: 29.9 PG (ref 26.5–33)
MCHC RBC AUTO-ENTMCNC: 32.1 G/DL (ref 31.5–36.5)
MCV RBC AUTO: 93 FL (ref 78–100)
NITRATE UR QL: NEGATIVE
PH UR STRIP: 6 [PH] (ref 5–7)
PLATELET # BLD AUTO: 260 10E3/UL (ref 150–450)
RBC # BLD AUTO: 4.41 10E6/UL (ref 4.4–5.9)
RBC #/AREA URNS AUTO: ABNORMAL /HPF
SP GR UR STRIP: 1.01 (ref 1–1.03)
UROBILINOGEN UR STRIP-ACNC: 0.2 E.U./DL
WBC # BLD AUTO: 7.1 10E3/UL (ref 4–11)
WBC #/AREA URNS AUTO: ABNORMAL /HPF

## 2025-07-21 PROCEDURE — 3075F SYST BP GE 130 - 139MM HG: CPT | Performed by: INTERNAL MEDICINE

## 2025-07-21 PROCEDURE — 81001 URINALYSIS AUTO W/SCOPE: CPT

## 2025-07-21 PROCEDURE — 82306 VITAMIN D 25 HYDROXY: CPT

## 2025-07-21 PROCEDURE — 82570 ASSAY OF URINE CREATININE: CPT

## 2025-07-21 PROCEDURE — 82043 UR ALBUMIN QUANTITATIVE: CPT

## 2025-07-21 PROCEDURE — 3078F DIAST BP <80 MM HG: CPT | Performed by: INTERNAL MEDICINE

## 2025-07-21 PROCEDURE — 83970 ASSAY OF PARATHORMONE: CPT

## 2025-07-21 PROCEDURE — 99204 OFFICE O/P NEW MOD 45 MIN: CPT | Performed by: INTERNAL MEDICINE

## 2025-07-21 PROCEDURE — 80069 RENAL FUNCTION PANEL: CPT

## 2025-07-21 PROCEDURE — 84156 ASSAY OF PROTEIN URINE: CPT

## 2025-07-21 PROCEDURE — 36415 COLL VENOUS BLD VENIPUNCTURE: CPT

## 2025-07-21 PROCEDURE — 85027 COMPLETE CBC AUTOMATED: CPT

## 2025-07-21 NOTE — PROGRESS NOTES
Assessment and Plan:  75 year old male with history of diabetes x 5-7 years, hypertension, prostate cancer s/p radiation and hormone therapy summer 2024, left hip replacement in 2019, who presents for evaluation. His creatinine has been 1.2-1.3 (eGFR 60)in recent years and up to 1.63 eGFR 43ml/min in June 2025    # CKD stage 2/3a- his Scr has been 1.2-1.3 in setting of diabetes, aging, and in recent months, has been treated for prostate cancer and was on creatine supplements.   I suspect his creatinine will be a little better today as he stopped the creatinine and stopped NSAIDs in June 2025   - labs pending today  - if not improved, will need to consider further workup  - consider SGLT2 inh in future.    # Hypertension: blood pressure 120-140 range currently. Discussed continuing exercise, healthy eating and will continue to monitor.  He is on lisinopril. He used to be on hydrochlorothiazide but it was stopped (? To be on ACE- I due to diabetes).      # Anemia in chronic renal disease:   - Hgb: mild anemia- may be due to renal function;  iron studies checked March 2025, iron sat was 30%; will check immunofixation- low suspicion  - Iron studies: Not checked recently    # Electrolytes:   - Potassium; level: High normal- his potassium has been high normal near 5.2-5.5 due to type IV RTA and CKD and being on lisinpril  - he has prostate cancer so discussed his urine stream and need to monitor for symptoms.  - Bicarbonate; level: Normal    # Mineral Bone Disorder:   - Vitamin D; level is: Normal  - Calcium; level is:  Normal  - Phosphorus; level is: pending      Assessment and plan was discussed with patient and he voiced his understanding and agreement.    Consult:  Timothy Dale was seen in consultation at the request of Dr. Rodríguez for LAISHA on CKD.    Reason for Visit:  Timothy Dale is a 75 year old male with LAISHA on CKD, who presents for evaluation.    HPI:  He is a pleasant male with history of diabetes,  arthritis, prostate cancer, who presents for evaluation     He presents for evaluation of kidney function  He was using aleve last year after some back pain and continued to use it but he stopped ibuprofen a couple of months ago with the kidney function, he stopped it all together  He used to take it three times a day before his hip replacement    He was diagnosed with prostate cancer and underwent radiation and hormone treatment, and it took him 6 months to feel 'normal' and get his energy back.   He notes that he had stopped surveillance for prostate but with new provider it was checked and noted up to 4.6  He underwent the radiation x 20 treatments and hormone therapy, and did ok overall.  We discussed his medications, the possibility of SGLT2 inhibitor was discussed and will consider in the future.    Renal History:   Kidney Disease and Medical Hx:  h/o DM: Yes     ROS:   A comprehensive review of systems was obtained and negative, except as noted in the HPI or PMH.    Active Medical Problems:  Patient Active Problem List   Diagnosis    Hyperlipidemia with target LDL less than 130    Hypertension goal BP (blood pressure) < 140/90    Type II diabetes mellitus with complication (H)    Abnormal auditory function study    CKD (chronic kidney disease) stage 3, GFR 30-59 ml/min (H)    Fatty liver    High prostate specific antigen (PSA)    Lower urinary tract symptoms due to benign prostatic hyperplasia    Primary malignant neoplasm of prostate (H)    Primary osteoarthritis of left hip    Snoring    Vitamin D deficiency    Strain of lumbar region, initial encounter    Chronic left-sided low back pain without sciatica     PMH:   Medical record was reviewed and PMH was discussed with patient and noted below.  Past Medical History:   Diagnosis Date    Arthritis 2010    Cancer (H) 2024    Chest pain 12/21/2012    Normal Stress Echo at [place]    Diabetes (H) 2017    Hyperlipidemia LDL goal < 130     Hypertension goal BP  (blood pressure) < 140/90     Shingles 10/01/2011     left trunk     PSH:   Past Surgical History:   Procedure Laterality Date    BIOPSY      COLONOSCOPY      EYE SURGERY  ?    ORTHOPEDIC SURGERY      SOFT TISSUE SURGERY  ?       Family Hx:   Family History   Problem Relation Age of Onset    Diabetes Mother     Alcohol/Drug Father     Cancer Father     Other Cancer Father         Throat    Prostate Cancer Maternal Grandfather     Heart Disease Paternal Grandfather      Personal Hx:   Social History     Tobacco Use    Smoking status: Former     Current packs/day: 0.00     Average packs/day: 1 pack/day for 7.0 years (7.0 ttl pk-yrs)     Types: Cigarettes     Start date: 10/7/1972     Quit date: 10/7/1979     Years since quittin.8    Smokeless tobacco: Never   Substance Use Topics    Alcohol use: Yes     Comment: 2 drinks per month       Allergies:  Allergies   Allergen Reactions    Nkda [No Known Drug Allergy]        Medications:  Current Outpatient Medications   Medication Sig Dispense Refill    acetaminophen (TYLENOL) 325 MG tablet Take 325-650 mg by mouth every 6 hours as needed for mild pain.      ASPIRIN 81 MG OR TABS 1 TABLET DAILY      lisinopril (ZESTRIL) 10 MG tablet TAKE ONE TABLET BY MOUTH EVERY DAY 90 tablet 1    metFORMIN (GLUCOPHAGE-XR) 750 MG 24 hr tablet TAKE ONE TABLET BY MOUTH TWICE A DAY WITH MEALS 180 tablet 1    Multiple Vitamin (DAILY VITAMINS PO) Take  by mouth daily.      semaglutide (OZEMPIC) 2 MG/3ML pen Inject 0.5 mg subcutaneously every 7 days. 3 mL 2     No current facility-administered medications for this visit.      Vitals:  /78   Pulse 74   Wt 91.6 kg (202 lb)   SpO2 98%   BMI 31.88 kg/m      Exam:  GENERAL APPEARANCE: alert and no distress  HENT: mouth without ulcers or lesions  RESP: lungs clear to auscultation - no rales, rhonchi or wheezes  CV: regular rhythm, normal rate, no rub, no murmur  EDEMA: no LE edema bilaterally  ABDOMEN: soft,  nondistended, nontender, bowel sounds normal  MS: extremities normal - no gross deformities noted, no evidence of inflammation in joints, no muscle tenderness  SKIN: no rash    LABS:   CMP  Recent Labs   Lab Test 06/18/25  0731 12/17/24  0902 07/15/24  0759 05/06/24  1010    138 139 139   POTASSIUM 5.4* 5.5* 5.2 5.3   CHLORIDE 102 101 102 104   CO2 22 23 25 24   ANIONGAP 14 14 12 11   * 170* 145* 131*   BUN 37.4* 34.2* 25.5* 30.9*   CR 1.63* 1.24* 1.32* 1.33*   GFRESTIMATED 43* 60* 56* 55*   LEVI 9.8 10.2 9.8 9.7     Recent Labs   Lab Test 06/18/25  0731 12/17/24  0902   BILITOTAL 0.5 0.3   ALKPHOS 68 78   ALT 37 32   AST 27 24     CBC  Recent Labs   Lab Test 03/10/25  0932 12/17/24  0902   HGB 13.0* 13.0*   WBC 6.7 5.9   RBC 4.25* 4.07*   HCT 38.5* 37.9*   MCV 91 93   MCH 30.6 31.9   MCHC 33.8 34.3   RDW 12.9 13.1    249     URINE STUDIES  Recent Labs   Lab Test 07/21/25  1434 03/10/25  0932   COLOR Yellow Yellow   APPEARANCE Clear Clear   URINEGLC Negative Negative   URINEBILI Negative Negative   URINEKETONE Negative Negative   SG 1.010 >=1.030   UBLD Negative Negative   URINEPH 6.0 5.0   PROTEIN Negative 30*   UROBILINOGEN 0.2 0.2   NITRITE Negative Negative   LEUKEST Negative Negative   RBCU 0-2 None Seen   WBCU 0-5 None Seen     No lab results found.  PTH  No lab results found.  IRON STUDIES  Recent Labs   Lab Test 03/10/25  0932   IRON 97      IRONSAT 32   YVTETE 196         Lyric Nicholas Walsh MD

## 2025-07-22 LAB
ALBUMIN MFR UR ELPH: <6 MG/DL
ALBUMIN SERPL BCG-MCNC: 4.2 G/DL (ref 3.5–5.2)
ANION GAP SERPL CALCULATED.3IONS-SCNC: 12 MMOL/L (ref 7–15)
BUN SERPL-MCNC: 26.1 MG/DL (ref 8–23)
CALCIUM SERPL-MCNC: 9.5 MG/DL (ref 8.8–10.4)
CHLORIDE SERPL-SCNC: 99 MMOL/L (ref 98–107)
CREAT SERPL-MCNC: 1.3 MG/DL (ref 0.67–1.17)
CREAT UR-MCNC: 59.8 MG/DL
CREAT UR-MCNC: 59.8 MG/DL
EGFRCR SERPLBLD CKD-EPI 2021: 57 ML/MIN/1.73M2
GLUCOSE SERPL-MCNC: 104 MG/DL (ref 70–99)
HCO3 SERPL-SCNC: 25 MMOL/L (ref 22–29)
MICROALBUMIN UR-MCNC: <12 MG/L
MICROALBUMIN/CREAT UR: NORMAL MG/G{CREAT}
PHOSPHATE SERPL-MCNC: 3.6 MG/DL (ref 2.5–4.5)
POTASSIUM SERPL-SCNC: 4.5 MMOL/L (ref 3.4–5.3)
PROT/CREAT 24H UR: NORMAL MG/G{CREAT}
PTH-INTACT SERPL-MCNC: 51 PG/ML (ref 15–65)
SODIUM SERPL-SCNC: 136 MMOL/L (ref 135–145)
VIT D+METAB SERPL-MCNC: 39 NG/ML (ref 20–50)

## 2025-07-23 LAB
LOCATION OF TASK: NORMAL
PROT ELPH PNL UR ELPH: NORMAL

## 2025-08-20 PROBLEM — G89.29 CHRONIC LEFT-SIDED LOW BACK PAIN WITHOUT SCIATICA: Status: RESOLVED | Noted: 2025-03-26 | Resolved: 2025-08-20

## 2025-08-20 PROBLEM — M54.50 CHRONIC LEFT-SIDED LOW BACK PAIN WITHOUT SCIATICA: Status: RESOLVED | Noted: 2025-03-26 | Resolved: 2025-08-20

## 2025-08-20 PROBLEM — S39.012A STRAIN OF LUMBAR REGION, INITIAL ENCOUNTER: Status: RESOLVED | Noted: 2025-03-26 | Resolved: 2025-08-20

## 2025-08-25 ENCOUNTER — NURSE TRIAGE (OUTPATIENT)
Dept: FAMILY MEDICINE | Facility: CLINIC | Age: 75
End: 2025-08-25
Payer: COMMERCIAL

## 2025-08-26 ENCOUNTER — OFFICE VISIT (OUTPATIENT)
Dept: FAMILY MEDICINE | Facility: CLINIC | Age: 75
End: 2025-08-26
Payer: COMMERCIAL

## 2025-08-26 VITALS
BODY MASS INDEX: 31.55 KG/M2 | SYSTOLIC BLOOD PRESSURE: 146 MMHG | RESPIRATION RATE: 16 BRPM | DIASTOLIC BLOOD PRESSURE: 81 MMHG | HEIGHT: 67 IN | OXYGEN SATURATION: 97 % | TEMPERATURE: 98 F | HEART RATE: 78 BPM | WEIGHT: 201 LBS

## 2025-08-26 DIAGNOSIS — K92.1 BLOOD IN STOOL: Primary | ICD-10-CM

## 2025-08-26 DIAGNOSIS — E11.8 TYPE II DIABETES MELLITUS WITH COMPLICATION (H): ICD-10-CM

## 2025-08-26 LAB
ERYTHROCYTE [DISTWIDTH] IN BLOOD BY AUTOMATED COUNT: 13.7 % (ref 10–15)
EST. AVERAGE GLUCOSE BLD GHB EST-MCNC: 134 MG/DL
HBA1C MFR BLD: 6.3 % (ref 0–5.6)
HCT VFR BLD AUTO: 42.4 % (ref 40–53)
HGB BLD-MCNC: 13.6 G/DL (ref 13.3–17.7)
HOLD SPECIMEN: NORMAL
MCH RBC QN AUTO: 29.8 PG (ref 26.5–33)
MCHC RBC AUTO-ENTMCNC: 32.1 G/DL (ref 31.5–36.5)
MCV RBC AUTO: 92.8 FL (ref 78–100)
PLATELET # BLD AUTO: 261 10E3/UL (ref 150–450)
RBC # BLD AUTO: 4.57 10E6/UL (ref 4.4–5.9)
WBC # BLD AUTO: 8.12 10E3/UL (ref 4–11)

## 2025-08-26 PROCEDURE — 83036 HEMOGLOBIN GLYCOSYLATED A1C: CPT | Performed by: FAMILY MEDICINE

## 2025-08-26 PROCEDURE — 3077F SYST BP >= 140 MM HG: CPT | Performed by: FAMILY MEDICINE

## 2025-08-26 PROCEDURE — 85027 COMPLETE CBC AUTOMATED: CPT | Performed by: FAMILY MEDICINE

## 2025-08-26 PROCEDURE — 3044F HG A1C LEVEL LT 7.0%: CPT | Performed by: FAMILY MEDICINE

## 2025-08-26 PROCEDURE — 99214 OFFICE O/P EST MOD 30 MIN: CPT | Performed by: FAMILY MEDICINE

## 2025-08-26 PROCEDURE — G2211 COMPLEX E/M VISIT ADD ON: HCPCS | Performed by: FAMILY MEDICINE

## 2025-08-26 PROCEDURE — 3078F DIAST BP <80 MM HG: CPT | Performed by: FAMILY MEDICINE

## 2025-08-26 PROCEDURE — 36415 COLL VENOUS BLD VENIPUNCTURE: CPT | Performed by: FAMILY MEDICINE

## 2025-08-27 DIAGNOSIS — N18.31 TYPE 2 DIABETES MELLITUS WITH STAGE 3A CHRONIC KIDNEY DISEASE, WITHOUT LONG-TERM CURRENT USE OF INSULIN (H): ICD-10-CM

## 2025-08-27 DIAGNOSIS — E11.8 TYPE II DIABETES MELLITUS WITH COMPLICATION (H): ICD-10-CM

## 2025-08-27 DIAGNOSIS — E11.22 TYPE 2 DIABETES MELLITUS WITH STAGE 3A CHRONIC KIDNEY DISEASE, WITHOUT LONG-TERM CURRENT USE OF INSULIN (H): ICD-10-CM

## 2025-08-27 RX ORDER — SEMAGLUTIDE 0.68 MG/ML
INJECTION, SOLUTION SUBCUTANEOUS
Qty: 3 ML | Refills: 2 | Status: SHIPPED | OUTPATIENT
Start: 2025-08-27

## 2025-08-27 RX ORDER — METFORMIN HYDROCHLORIDE 750 MG/1
750 TABLET, EXTENDED RELEASE ORAL 2 TIMES DAILY WITH MEALS
Qty: 180 TABLET | Refills: 1 | Status: SHIPPED | OUTPATIENT
Start: 2025-08-27

## 2025-08-28 ENCOUNTER — TELEPHONE (OUTPATIENT)
Dept: GASTROENTEROLOGY | Facility: CLINIC | Age: 75
End: 2025-08-28
Payer: COMMERCIAL

## 2025-08-28 ENCOUNTER — MYC MEDICAL ADVICE (OUTPATIENT)
Dept: FAMILY MEDICINE | Facility: CLINIC | Age: 75
End: 2025-08-28
Payer: COMMERCIAL